# Patient Record
Sex: MALE | Race: WHITE | NOT HISPANIC OR LATINO
[De-identification: names, ages, dates, MRNs, and addresses within clinical notes are randomized per-mention and may not be internally consistent; named-entity substitution may affect disease eponyms.]

---

## 2017-01-16 ENCOUNTER — RX RENEWAL (OUTPATIENT)
Age: 52
End: 2017-01-16

## 2017-01-24 ENCOUNTER — APPOINTMENT (OUTPATIENT)
Dept: FAMILY MEDICINE | Facility: CLINIC | Age: 52
End: 2017-01-24

## 2017-01-24 VITALS
OXYGEN SATURATION: 98 % | WEIGHT: 283 LBS | SYSTOLIC BLOOD PRESSURE: 120 MMHG | DIASTOLIC BLOOD PRESSURE: 70 MMHG | HEART RATE: 89 BPM | BODY MASS INDEX: 40.52 KG/M2 | TEMPERATURE: 97.6 F | HEIGHT: 70 IN | RESPIRATION RATE: 13 BRPM

## 2017-01-24 DIAGNOSIS — E66.1 DRUG-INDUCED OBESITY: ICD-10-CM

## 2017-01-24 DIAGNOSIS — R25.1 TREMOR, UNSPECIFIED: ICD-10-CM

## 2017-01-24 LAB
BILIRUB UR QL STRIP: NORMAL
CLARITY UR: CLEAR
COLLECTION METHOD: NORMAL
GLUCOSE UR-MCNC: NORMAL
HCG UR QL: 0.2 EU/DL
HGB UR QL STRIP.AUTO: NORMAL
KETONES UR-MCNC: NORMAL
LEUKOCYTE ESTERASE UR QL STRIP: NORMAL
NITRITE UR QL STRIP: NORMAL
PH UR STRIP: 6.5
PROT UR STRIP-MCNC: NORMAL
SP GR UR STRIP: 1.02

## 2017-01-24 RX ORDER — SILDENAFIL CITRATE 100 MG/1
100 TABLET, FILM COATED ORAL
Qty: 6 | Refills: 0 | Status: ACTIVE | COMMUNITY
Start: 2016-11-04

## 2017-01-25 LAB
ALBUMIN SERPL ELPH-MCNC: 4.6 G/DL
ALP BLD-CCNC: 31 U/L
ALT SERPL-CCNC: 93 U/L
ANION GAP SERPL CALC-SCNC: 22 MMOL/L
AST SERPL-CCNC: 54 U/L
BASOPHILS # BLD AUTO: 0.01 K/UL
BASOPHILS NFR BLD AUTO: 0.1 %
BILIRUB SERPL-MCNC: 0.4 MG/DL
BUN SERPL-MCNC: 21 MG/DL
CALCIUM SERPL-MCNC: 10.9 MG/DL
CHLORIDE SERPL-SCNC: 99 MMOL/L
CO2 SERPL-SCNC: 22 MMOL/L
CREAT SERPL-MCNC: 1.12 MG/DL
EOSINOPHIL # BLD AUTO: 0 K/UL
EOSINOPHIL NFR BLD AUTO: 0 %
GLUCOSE SERPL-MCNC: 104 MG/DL
HCT VFR BLD CALC: 43.5 %
HGB BLD-MCNC: 14.2 G/DL
IMM GRANULOCYTES NFR BLD AUTO: 0.2 %
LYMPHOCYTES # BLD AUTO: 1.08 K/UL
LYMPHOCYTES NFR BLD AUTO: 13.5 %
MAN DIFF?: NORMAL
MCHC RBC-ENTMCNC: 27.2 PG
MCHC RBC-ENTMCNC: 32.6 GM/DL
MCV RBC AUTO: 83.3 FL
MONOCYTES # BLD AUTO: 1 K/UL
MONOCYTES NFR BLD AUTO: 12.5 %
NEUTROPHILS # BLD AUTO: 5.9 K/UL
NEUTROPHILS NFR BLD AUTO: 73.7 %
PLATELET # BLD AUTO: 191 K/UL
POTASSIUM SERPL-SCNC: 4.2 MMOL/L
PROT SERPL-MCNC: 7 G/DL
RBC # BLD: 5.22 M/UL
RBC # FLD: 15 %
SODIUM SERPL-SCNC: 143 MMOL/L
WBC # FLD AUTO: 8.01 K/UL

## 2017-01-31 ENCOUNTER — APPOINTMENT (OUTPATIENT)
Dept: FAMILY MEDICINE | Facility: CLINIC | Age: 52
End: 2017-01-31

## 2017-01-31 VITALS
OXYGEN SATURATION: 98 % | RESPIRATION RATE: 13 BRPM | HEART RATE: 83 BPM | HEIGHT: 70 IN | DIASTOLIC BLOOD PRESSURE: 74 MMHG | BODY MASS INDEX: 40.52 KG/M2 | WEIGHT: 283 LBS | SYSTOLIC BLOOD PRESSURE: 122 MMHG

## 2017-03-28 RX ORDER — CIPROFLOXACIN HYDROCHLORIDE 500 MG/1
500 TABLET, FILM COATED ORAL
Qty: 20 | Refills: 0 | Status: DISCONTINUED | COMMUNITY
Start: 2017-01-24 | End: 2017-03-28

## 2017-04-12 ENCOUNTER — APPOINTMENT (OUTPATIENT)
Dept: PULMONOLOGY | Facility: CLINIC | Age: 52
End: 2017-04-12

## 2017-04-12 VITALS
DIASTOLIC BLOOD PRESSURE: 68 MMHG | WEIGHT: 286 LBS | HEART RATE: 88 BPM | SYSTOLIC BLOOD PRESSURE: 132 MMHG | OXYGEN SATURATION: 96 % | BODY MASS INDEX: 41.04 KG/M2

## 2017-04-12 RX ORDER — METRONIDAZOLE 500 MG/1
500 TABLET ORAL
Qty: 15 | Refills: 0 | Status: DISCONTINUED | COMMUNITY
Start: 2017-01-24 | End: 2017-04-12

## 2017-04-12 RX ORDER — BACILLUS COAGULANS/INULIN 1B-250 MG
CAPSULE ORAL
Refills: 0 | Status: ACTIVE | COMMUNITY

## 2017-05-09 ENCOUNTER — APPOINTMENT (OUTPATIENT)
Dept: FAMILY MEDICINE | Facility: CLINIC | Age: 52
End: 2017-05-09

## 2017-05-09 VITALS
WEIGHT: 290 LBS | SYSTOLIC BLOOD PRESSURE: 124 MMHG | HEART RATE: 73 BPM | OXYGEN SATURATION: 98 % | RESPIRATION RATE: 12 BRPM | DIASTOLIC BLOOD PRESSURE: 76 MMHG | BODY MASS INDEX: 41.52 KG/M2 | HEIGHT: 70 IN

## 2017-05-09 RX ORDER — NYSTATIN 100000 [USP'U]/ML
100000 SUSPENSION ORAL
Refills: 0 | Status: DISCONTINUED | COMMUNITY
End: 2017-05-09

## 2017-07-19 ENCOUNTER — APPOINTMENT (OUTPATIENT)
Dept: PULMONOLOGY | Facility: CLINIC | Age: 52
End: 2017-07-19

## 2017-07-19 VITALS
OXYGEN SATURATION: 95 % | SYSTOLIC BLOOD PRESSURE: 120 MMHG | DIASTOLIC BLOOD PRESSURE: 70 MMHG | HEART RATE: 88 BPM | BODY MASS INDEX: 43.26 KG/M2 | HEIGHT: 70 IN

## 2017-07-19 VITALS — BODY MASS INDEX: 43.26 KG/M2 | WEIGHT: 301.5 LBS

## 2017-08-14 ENCOUNTER — RX RENEWAL (OUTPATIENT)
Age: 52
End: 2017-08-14

## 2017-08-28 ENCOUNTER — APPOINTMENT (OUTPATIENT)
Dept: FAMILY MEDICINE | Facility: CLINIC | Age: 52
End: 2017-08-28
Payer: MEDICARE

## 2017-08-28 VITALS
BODY MASS INDEX: 42.95 KG/M2 | HEIGHT: 70 IN | TEMPERATURE: 97.9 F | RESPIRATION RATE: 12 BRPM | DIASTOLIC BLOOD PRESSURE: 72 MMHG | WEIGHT: 300 LBS | SYSTOLIC BLOOD PRESSURE: 128 MMHG | OXYGEN SATURATION: 99 % | HEART RATE: 91 BPM

## 2017-08-28 PROCEDURE — 99214 OFFICE O/P EST MOD 30 MIN: CPT

## 2017-09-05 ENCOUNTER — APPOINTMENT (OUTPATIENT)
Dept: FAMILY MEDICINE | Facility: CLINIC | Age: 52
End: 2017-09-05
Payer: MEDICARE

## 2017-09-05 VITALS
TEMPERATURE: 98.7 F | HEART RATE: 92 BPM | DIASTOLIC BLOOD PRESSURE: 74 MMHG | BODY MASS INDEX: 43.05 KG/M2 | SYSTOLIC BLOOD PRESSURE: 126 MMHG | RESPIRATION RATE: 12 BRPM | OXYGEN SATURATION: 98 % | WEIGHT: 300 LBS

## 2017-09-05 PROCEDURE — 99214 OFFICE O/P EST MOD 30 MIN: CPT

## 2017-11-15 ENCOUNTER — APPOINTMENT (OUTPATIENT)
Dept: PULMONOLOGY | Facility: CLINIC | Age: 52
End: 2017-11-15

## 2017-11-21 ENCOUNTER — APPOINTMENT (OUTPATIENT)
Dept: PULMONOLOGY | Facility: CLINIC | Age: 52
End: 2017-11-21
Payer: MEDICARE

## 2017-11-21 VITALS — WEIGHT: 295 LBS | BODY MASS INDEX: 42.33 KG/M2

## 2017-11-21 VITALS — DIASTOLIC BLOOD PRESSURE: 74 MMHG | SYSTOLIC BLOOD PRESSURE: 120 MMHG

## 2017-11-21 PROCEDURE — 99214 OFFICE O/P EST MOD 30 MIN: CPT | Mod: 25

## 2017-11-21 PROCEDURE — 94010 BREATHING CAPACITY TEST: CPT

## 2017-12-06 ENCOUNTER — APPOINTMENT (OUTPATIENT)
Dept: FAMILY MEDICINE | Facility: CLINIC | Age: 52
End: 2017-12-06
Payer: MEDICARE

## 2017-12-06 VITALS
WEIGHT: 301 LBS | BODY MASS INDEX: 43.09 KG/M2 | HEIGHT: 70 IN | DIASTOLIC BLOOD PRESSURE: 72 MMHG | OXYGEN SATURATION: 95 % | SYSTOLIC BLOOD PRESSURE: 118 MMHG | RESPIRATION RATE: 12 BRPM | TEMPERATURE: 98.2 F | HEART RATE: 72 BPM

## 2017-12-06 DIAGNOSIS — M54.5 LOW BACK PAIN: ICD-10-CM

## 2017-12-06 DIAGNOSIS — Z87.19 PERSONAL HISTORY OF OTHER DISEASES OF THE DIGESTIVE SYSTEM: ICD-10-CM

## 2017-12-06 PROCEDURE — 99214 OFFICE O/P EST MOD 30 MIN: CPT | Mod: 25

## 2017-12-06 PROCEDURE — 36415 COLL VENOUS BLD VENIPUNCTURE: CPT

## 2017-12-10 LAB
ALBUMIN SERPL ELPH-MCNC: 4.2 G/DL
ALP BLD-CCNC: 37 U/L
ALT SERPL-CCNC: 59 U/L
ANION GAP SERPL CALC-SCNC: 14 MMOL/L
APPEARANCE: CLEAR
AST SERPL-CCNC: 32 U/L
BASOPHILS # BLD AUTO: 0 K/UL
BASOPHILS NFR BLD AUTO: 0 %
BILIRUB SERPL-MCNC: 0.3 MG/DL
BILIRUBIN URINE: NEGATIVE
BLOOD URINE: NEGATIVE
BUN SERPL-MCNC: 11 MG/DL
CALCIUM SERPL-MCNC: 10.4 MG/DL
CHLORIDE SERPL-SCNC: 103 MMOL/L
CHOLEST SERPL-MCNC: 180 MG/DL
CHOLEST/HDLC SERPL: 6 RATIO
CO2 SERPL-SCNC: 26 MMOL/L
COLOR: YELLOW
CREAT SERPL-MCNC: 0.9 MG/DL
EOSINOPHIL # BLD AUTO: 0 K/UL
EOSINOPHIL NFR BLD AUTO: 0 %
ERYTHROCYTE [SEDIMENTATION RATE] IN BLOOD BY WESTERGREN METHOD: 3 MM/HR
GLUCOSE QUALITATIVE U: NEGATIVE MG/DL
GLUCOSE SERPL-MCNC: 95 MG/DL
HCT VFR BLD CALC: 42.5 %
HDLC SERPL-MCNC: 30 MG/DL
HGB BLD-MCNC: 14.1 G/DL
IMM GRANULOCYTES NFR BLD AUTO: 0.7 %
KETONES URINE: NEGATIVE
LDLC SERPL CALC-MCNC: 107 MG/DL
LEUKOCYTE ESTERASE URINE: NEGATIVE
LYMPHOCYTES # BLD AUTO: 1.28 K/UL
LYMPHOCYTES NFR BLD AUTO: 28.8 %
MAN DIFF?: NORMAL
MCHC RBC-ENTMCNC: 27.1 PG
MCHC RBC-ENTMCNC: 33.2 GM/DL
MCV RBC AUTO: 81.7 FL
MONOCYTES # BLD AUTO: 0.47 K/UL
MONOCYTES NFR BLD AUTO: 10.6 %
NEUTROPHILS # BLD AUTO: 2.67 K/UL
NEUTROPHILS NFR BLD AUTO: 59.9 %
NITRITE URINE: NEGATIVE
PH URINE: 7.5
PLATELET # BLD AUTO: 174 K/UL
POTASSIUM SERPL-SCNC: 4.4 MMOL/L
PROT SERPL-MCNC: 6.8 G/DL
PROTEIN URINE: NEGATIVE MG/DL
RBC # BLD: 5.2 M/UL
RBC # FLD: 14.3 %
SODIUM SERPL-SCNC: 143 MMOL/L
SPECIFIC GRAVITY URINE: 1.01
TRIGL SERPL-MCNC: 216 MG/DL
TSH SERPL-ACNC: 1.85 UIU/ML
UROBILINOGEN URINE: NEGATIVE MG/DL
WBC # FLD AUTO: 4.45 K/UL

## 2018-01-27 ENCOUNTER — APPOINTMENT (OUTPATIENT)
Dept: FAMILY MEDICINE | Facility: CLINIC | Age: 53
End: 2018-01-27
Payer: MEDICARE

## 2018-01-27 VITALS
TEMPERATURE: 97.7 F | OXYGEN SATURATION: 95 % | SYSTOLIC BLOOD PRESSURE: 126 MMHG | WEIGHT: 297 LBS | HEIGHT: 70 IN | RESPIRATION RATE: 12 BRPM | BODY MASS INDEX: 42.52 KG/M2 | DIASTOLIC BLOOD PRESSURE: 74 MMHG | HEART RATE: 72 BPM

## 2018-01-27 PROCEDURE — 99213 OFFICE O/P EST LOW 20 MIN: CPT

## 2018-01-31 ENCOUNTER — TRANSCRIPTION ENCOUNTER (OUTPATIENT)
Age: 53
End: 2018-01-31

## 2018-02-20 ENCOUNTER — APPOINTMENT (OUTPATIENT)
Dept: FAMILY MEDICINE | Facility: CLINIC | Age: 53
End: 2018-02-20
Payer: MEDICARE

## 2018-02-20 VITALS
HEIGHT: 70 IN | RESPIRATION RATE: 13 BRPM | OXYGEN SATURATION: 97 % | HEART RATE: 98 BPM | SYSTOLIC BLOOD PRESSURE: 108 MMHG | BODY MASS INDEX: 41.52 KG/M2 | DIASTOLIC BLOOD PRESSURE: 70 MMHG | TEMPERATURE: 98.2 F | WEIGHT: 290 LBS

## 2018-02-20 DIAGNOSIS — M25.511 PAIN IN RIGHT SHOULDER: ICD-10-CM

## 2018-02-20 PROCEDURE — 99214 OFFICE O/P EST MOD 30 MIN: CPT

## 2018-04-23 ENCOUNTER — RX RENEWAL (OUTPATIENT)
Age: 53
End: 2018-04-23

## 2018-05-16 ENCOUNTER — APPOINTMENT (OUTPATIENT)
Dept: PULMONOLOGY | Facility: CLINIC | Age: 53
End: 2018-05-16
Payer: MEDICARE

## 2018-05-16 VITALS — BODY MASS INDEX: 42.9 KG/M2 | WEIGHT: 299 LBS

## 2018-05-16 VITALS
DIASTOLIC BLOOD PRESSURE: 68 MMHG | BODY MASS INDEX: 42.18 KG/M2 | OXYGEN SATURATION: 96 % | WEIGHT: 294 LBS | HEART RATE: 86 BPM | SYSTOLIC BLOOD PRESSURE: 136 MMHG

## 2018-05-16 PROCEDURE — 94060 EVALUATION OF WHEEZING: CPT

## 2018-05-16 PROCEDURE — 94664 DEMO&/EVAL PT USE INHALER: CPT | Mod: 59

## 2018-05-16 PROCEDURE — 99214 OFFICE O/P EST MOD 30 MIN: CPT | Mod: 25

## 2018-05-16 RX ORDER — PSYLLIUM HUSK 0.4 G
CAPSULE ORAL
Refills: 0 | Status: ACTIVE | COMMUNITY

## 2018-05-16 RX ORDER — TESTOSTERONE 16.2 MG/G
20.25 MG/ACT GEL TRANSDERMAL
Qty: 75 | Refills: 0 | Status: DISCONTINUED | COMMUNITY
Start: 2017-10-04 | End: 2018-05-16

## 2018-05-16 RX ORDER — TRIAMCINOLONE ACETONIDE 1 MG/G
0.1 CREAM TOPICAL
Qty: 30 | Refills: 0 | Status: DISCONTINUED | COMMUNITY
Start: 2016-08-08 | End: 2018-05-16

## 2018-05-16 RX ORDER — TAMSULOSIN HYDROCHLORIDE 0.4 MG/1
0.4 CAPSULE ORAL
Refills: 0 | Status: DISCONTINUED | COMMUNITY
End: 2018-05-16

## 2018-05-16 RX ORDER — MULTIVITAMIN
TABLET ORAL
Refills: 0 | Status: ACTIVE | COMMUNITY

## 2018-05-23 ENCOUNTER — APPOINTMENT (OUTPATIENT)
Dept: FAMILY MEDICINE | Facility: CLINIC | Age: 53
End: 2018-05-23
Payer: MEDICARE

## 2018-05-23 VITALS
WEIGHT: 298 LBS | OXYGEN SATURATION: 98 % | SYSTOLIC BLOOD PRESSURE: 124 MMHG | HEART RATE: 92 BPM | TEMPERATURE: 97.7 F | HEIGHT: 70 IN | RESPIRATION RATE: 13 BRPM | BODY MASS INDEX: 42.66 KG/M2 | DIASTOLIC BLOOD PRESSURE: 70 MMHG

## 2018-05-23 PROCEDURE — 99214 OFFICE O/P EST MOD 30 MIN: CPT

## 2018-06-26 ENCOUNTER — APPOINTMENT (OUTPATIENT)
Dept: FAMILY MEDICINE | Facility: CLINIC | Age: 53
End: 2018-06-26
Payer: MEDICARE

## 2018-06-26 VITALS
OXYGEN SATURATION: 98 % | WEIGHT: 300 LBS | HEIGHT: 70 IN | RESPIRATION RATE: 13 BRPM | SYSTOLIC BLOOD PRESSURE: 124 MMHG | HEART RATE: 90 BPM | DIASTOLIC BLOOD PRESSURE: 76 MMHG | BODY MASS INDEX: 42.95 KG/M2

## 2018-06-26 DIAGNOSIS — N20.0 CALCULUS OF KIDNEY: ICD-10-CM

## 2018-06-26 DIAGNOSIS — G47.00 INSOMNIA, UNSPECIFIED: ICD-10-CM

## 2018-06-26 DIAGNOSIS — Z87.19 PERSONAL HISTORY OF OTHER DISEASES OF THE DIGESTIVE SYSTEM: ICD-10-CM

## 2018-06-26 PROCEDURE — 99214 OFFICE O/P EST MOD 30 MIN: CPT | Mod: 25

## 2018-06-26 PROCEDURE — 83036 HEMOGLOBIN GLYCOSYLATED A1C: CPT | Mod: QW

## 2018-06-26 PROCEDURE — 36415 COLL VENOUS BLD VENIPUNCTURE: CPT

## 2018-06-26 NOTE — HISTORY OF PRESENT ILLNESS
[FreeTextEntry1] : Patient in for follow up of LFT and DM HTN and BMI and HLD  Has BIB on CPAP  compliant\par In for labs wants lab slip, wants to consider weight loss program\par Has follow up with Psychiatry

## 2018-06-26 NOTE — HEALTH RISK ASSESSMENT
[No falls in past year] : Patient reported no falls in the past year [1] : 1) Little interest or pleasure doing things for several days (1) [0] : 2) Feeling down, depressed, or hopeless: Not at all (0) [] : No [[id number]] : 1

## 2018-06-26 NOTE — COUNSELING
[Healthy eating counseling provided] : healthy eating [Activity counseling provided] : activity [Keep Food Diary] : Keep food diary [Low Salt Diet] : Low salt diet [Walking] : Walking [Plan in advance] : Plan in advance [Good understanding] : Patient has a good understanding of lifestyle changes and the steps needed to achieve self management goals

## 2018-06-26 NOTE — PLAN
[FreeTextEntry1] : Patient in for follow up of LFT and DM HTN and BMI and HLD  Has BIB on CPAP  compliant\par In for labs wants lab slip, wants to consider weight loss program\par Has follow up with Psychiatry\par Poct A1c reviewed patient DM good  RTC 3 month advised weight loss and weight watchers

## 2018-06-26 NOTE — PHYSICAL EXAM

## 2018-07-27 ENCOUNTER — RX RENEWAL (OUTPATIENT)
Age: 53
End: 2018-07-27

## 2018-08-07 LAB — HBA1C MFR BLD HPLC: 5.4

## 2018-08-22 ENCOUNTER — APPOINTMENT (OUTPATIENT)
Dept: PULMONOLOGY | Facility: CLINIC | Age: 53
End: 2018-08-22
Payer: MEDICARE

## 2018-08-22 VITALS
HEART RATE: 87 BPM | HEIGHT: 70 IN | OXYGEN SATURATION: 96 % | BODY MASS INDEX: 42.33 KG/M2 | SYSTOLIC BLOOD PRESSURE: 130 MMHG | DIASTOLIC BLOOD PRESSURE: 78 MMHG

## 2018-08-22 VITALS — WEIGHT: 295 LBS | BODY MASS INDEX: 42.33 KG/M2

## 2018-08-22 PROCEDURE — 94060 EVALUATION OF WHEEZING: CPT

## 2018-08-22 PROCEDURE — 99214 OFFICE O/P EST MOD 30 MIN: CPT | Mod: 25

## 2018-08-22 PROCEDURE — 94664 DEMO&/EVAL PT USE INHALER: CPT | Mod: 59

## 2018-09-06 LAB — HBA1C MFR BLD: 5.3

## 2018-09-14 ENCOUNTER — RX RENEWAL (OUTPATIENT)
Age: 53
End: 2018-09-14

## 2018-09-18 ENCOUNTER — RX RENEWAL (OUTPATIENT)
Age: 53
End: 2018-09-18

## 2018-10-16 ENCOUNTER — APPOINTMENT (OUTPATIENT)
Dept: FAMILY MEDICINE | Facility: CLINIC | Age: 53
End: 2018-10-16
Payer: MEDICARE

## 2018-10-16 VITALS
OXYGEN SATURATION: 98 % | HEIGHT: 70 IN | RESPIRATION RATE: 13 BRPM | WEIGHT: 290 LBS | BODY MASS INDEX: 41.52 KG/M2 | HEART RATE: 76 BPM | SYSTOLIC BLOOD PRESSURE: 128 MMHG | DIASTOLIC BLOOD PRESSURE: 74 MMHG

## 2018-10-16 PROCEDURE — G0439: CPT

## 2018-10-16 PROCEDURE — G0402 INITIAL PREVENTIVE EXAM: CPT

## 2018-10-18 NOTE — REVIEW OF SYSTEMS
[Redness] : redness [Dryness] : dryness [Vision Problems] : vision problems [Constipation] : constipation [Diarrhea] : diarrhea [Negative] : Heme/Lymph [Fever] : no fever [Chills] : no chills [Fatigue] : no fatigue [Night Sweats] : no night sweats [Recent Change In Weight] : ~T no recent weight change [Discharge] : no discharge [Pain] : no pain [Itching] : no itching [Earache] : no earache [Hearing Loss] : no hearing loss [Nosebleeds] : no nosebleeds [Postnasal Drip] : no postnasal drip [Nasal Discharge] : no nasal discharge [Sore Throat] : no sore throat [Hoarseness] : no hoarseness [Chest Pain] : no chest pain [Palpitations] : no palpitations [Claudication] : no  leg claudication [Lower Ext Edema] : no lower extremity edema [Orthopena] : no orthopnea [Paroysmal Nocturnal Dyspnea] : no paroysmal nocturnal dyspnea [Shortness Of Breath] : no shortness of breath [Wheezing] : no wheezing [Cough] : no cough [Dyspnea on Exertion] : not dyspnea on exertion [Abdominal Pain] : no abdominal pain [Nausea] : no nausea [Vomiting] : no vomiting [Heartburn] : no heartburn [Melena] : no melena [Dysuria] : no dysuria [Incontinence] : no incontinence [Hesitancy] : no hesitancy [Nocturia] : no nocturia [Hematuria] : no hematuria [Frequency] : no frequency [Joint Pain] : no joint pain [Joint Stiffness] : no joint stiffness [Muscle Pain] : no muscle pain [Muscle Weakness] : no muscle weakness [Back Pain] : no back pain [Joint Swelling] : no joint swelling [Itching] : no itching [Hair Changes] : no hair changes [Skin Rash] : no skin rash [Headache] : no headache [Dizziness] : no dizziness [Fainting] : no fainting [Confusion] : no confusion [Unsteady Walk] : no ataxia [Memory Loss] : no memory loss [Suicidal] : not suicidal [Insomnia] : no insomnia [Anxiety] : no anxiety [Depression] : no depression [Easy Bleeding] : no easy bleeding [Easy Bruising] : no easy bruising [Swollen Glands] : no swollen glands [FreeTextEntry7] : second to hemorroids

## 2018-10-18 NOTE — HEALTH RISK ASSESSMENT
[Good] : ~his/her~  mood as  good [Patient reported colonoscopy was normal] : Patient reported colonoscopy was normal [None] : None [Alone] : lives alone [On disability] : on disability [Retired] : retired [College] : College [Single] : single [Feels Safe at Home] : Feels safe at home [Fully functional (bathing, dressing, toileting, transferring, walking, feeding)] : Fully functional (bathing, dressing, toileting, transferring, walking, feeding) [Fully functional (using the telephone, shopping, preparing meals, housekeeping, doing laundry, using] : Fully functional and needs no help or supervision to perform IADLs (using the telephone, shopping, preparing meals, housekeeping, doing laundry, using transportation, managing medications and managing finances) [Reports changes in vision] : Reports changes in vision [Reports changes in dental health] : Reports changes in dental health [Smoke Detector] : smoke detector [Carbon Monoxide Detector] : carbon monoxide detector [Safety elements used in home] : safety elements used in home [Seat Belt] :  uses seat belt [Sunscreen] : uses sunscreen [Patient declined discussion] : Patient declined discussion [FreeTextEntry1] : Eyes [] : No [de-identified] : New Psychiatrist Dr. Aubrey Santos [Change in mental status noted] : No change in mental status noted [Language] : denies difficulty with language [Behavior] : denies difficulty with behavior [Learning/Retaining New Information] : denies difficulty learning/retaining new information [Handling Complex Tasks] : denies difficulty handling complex tasks [Reasoning] : denies difficulty with reasoning [Spatial Ability and Orientation] : denies difficulty with spatial ability and orientation [Sexually Active] : not sexually active [High Risk Behavior] : no high risk behavior [Reports changes in hearing] : Reports no changes in hearing [Travel to Developing Areas] : does not  travel to developing areas [TB Exposure] : is not being exposed to tuberculosis [Caregiver Concerns] : does not have caregiver concerns [ColonoscopyDate] : 03/09 [ColonoscopyComments] : Had polyps removed [HIVDate] : 01/85 [HepatitisCComments] : unsure [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

## 2018-10-18 NOTE — HISTORY OF PRESENT ILLNESS
[FreeTextEntry1] : Patient here for Medicare Wellness exam Patient with long standing mental illness and is stable [de-identified] : He states his only complaint at this time is the retinal tears in both eyes.  Sees Dr. Cheney next week (10/24/18).

## 2018-11-08 LAB
MICROALBUMIN 24H UR DL<=1MG/L-MCNC: <3
MICROALBUMIN/CREAT 24H UR-RTO: <4.4

## 2018-12-12 ENCOUNTER — APPOINTMENT (OUTPATIENT)
Dept: PULMONOLOGY | Facility: CLINIC | Age: 53
End: 2018-12-12
Payer: MEDICARE

## 2018-12-12 VITALS — OXYGEN SATURATION: 97 % | SYSTOLIC BLOOD PRESSURE: 112 MMHG | HEART RATE: 69 BPM | DIASTOLIC BLOOD PRESSURE: 60 MMHG

## 2018-12-12 VITALS — WEIGHT: 278 LBS | BODY MASS INDEX: 39.89 KG/M2

## 2018-12-12 PROCEDURE — 99214 OFFICE O/P EST MOD 30 MIN: CPT | Mod: 25

## 2018-12-12 PROCEDURE — 94010 BREATHING CAPACITY TEST: CPT

## 2019-01-08 ENCOUNTER — APPOINTMENT (OUTPATIENT)
Dept: FAMILY MEDICINE | Facility: CLINIC | Age: 54
End: 2019-01-08

## 2019-01-08 VITALS
SYSTOLIC BLOOD PRESSURE: 120 MMHG | RESPIRATION RATE: 13 BRPM | HEART RATE: 72 BPM | WEIGHT: 278 LBS | BODY MASS INDEX: 39.8 KG/M2 | DIASTOLIC BLOOD PRESSURE: 68 MMHG | HEIGHT: 70 IN | OXYGEN SATURATION: 98 %

## 2019-01-08 NOTE — HISTORY OF PRESENT ILLNESS
[FreeTextEntry8] : 53 yr M here to  script for Immunity testing to Hep B as he only had 2 shots of Hep B vaccination.Pt is going to outside lab with more bloodwork for his pschiatrist.

## 2019-01-08 NOTE — REVIEW OF SYSTEMS
[Fever] : no fever [Chills] : no chills [Fatigue] : no fatigue [Night Sweats] : no night sweats [Recent Change In Weight] : ~T no recent weight change [Discharge] : no discharge [Pain] : no pain [Redness] : no redness [Dryness] : dryness [Vision Problems] : vision problems [Itching] : no itching [Earache] : no earache [Hearing Loss] : no hearing loss [Nosebleeds] : no nosebleeds [Postnasal Drip] : no postnasal drip [Nasal Discharge] : no nasal discharge [Sore Throat] : no sore throat [Hoarseness] : no hoarseness [Chest Pain] : no chest pain [Palpitations] : no palpitations [Claudication] : no  leg claudication [Lower Ext Edema] : no lower extremity edema [Orthopena] : no orthopnea [Paroysmal Nocturnal Dyspnea] : no paroysmal nocturnal dyspnea [Shortness Of Breath] : no shortness of breath [Wheezing] : no wheezing [Cough] : no cough [Dyspnea on Exertion] : not dyspnea on exertion [Abdominal Pain] : no abdominal pain [Nausea] : no nausea [Constipation] : constipation [Diarrhea] : diarrhea [Vomiting] : no vomiting [Heartburn] : no heartburn [Melena] : no melena [Dysuria] : no dysuria [Incontinence] : no incontinence [Hesitancy] : no hesitancy [Nocturia] : no nocturia [Hematuria] : no hematuria [Frequency] : no frequency [Joint Pain] : no joint pain [Joint Stiffness] : no joint stiffness [Muscle Pain] : no muscle pain [Muscle Weakness] : no muscle weakness [Back Pain] : no back pain [Joint Swelling] : no joint swelling [Hair Changes] : no hair changes [Skin Rash] : no skin rash [Headache] : no headache [Dizziness] : no dizziness [Fainting] : no fainting [Confusion] : no confusion [Unsteady Walk] : no ataxia [Memory Loss] : no memory loss [Suicidal] : not suicidal [Insomnia] : no insomnia [Anxiety] : no anxiety [Depression] : no depression [Easy Bleeding] : no easy bleeding [Easy Bruising] : no easy bruising [Swollen Glands] : no swollen glands [Negative] : Heme/Lymph [FreeTextEntry7] : second to hemorroids

## 2019-01-08 NOTE — PHYSICAL EXAM
[No Acute Distress] : no acute distress [Well Nourished] : well nourished [Well Developed] : well developed [Well-Appearing] : well-appearing [No JVD] : no jugular venous distention [Supple] : supple [No Respiratory Distress] : no respiratory distress  [Clear to Auscultation] : lungs were clear to auscultation bilaterally [No Accessory Muscle Use] : no accessory muscle use [Normal Rate] : normal rate  [Regular Rhythm] : with a regular rhythm [Normal S1, S2] : normal S1 and S2 [No Murmur] : no murmur heard [No Edema] : there was no peripheral edema [Soft] : abdomen soft [Non Tender] : non-tender [Non-distended] : non-distended [No Masses] : no abdominal mass palpated [No HSM] : no HSM [Normal Bowel Sounds] : normal bowel sounds [No CVA Tenderness] : no CVA  tenderness [No Spinal Tenderness] : no spinal tenderness [No Joint Swelling] : no joint swelling [Grossly Normal Strength/Tone] : grossly normal strength/tone [No Rash] : no rash [Normal Gait] : normal gait [Coordination Grossly Intact] : coordination grossly intact [No Focal Deficits] : no focal deficits [Deep Tendon Reflexes (DTR)] : deep tendon reflexes were 2+ and symmetric [Normal Affect] : the affect was normal [Normal Insight/Judgement] : insight and judgment were intact

## 2019-01-08 NOTE — HEALTH RISK ASSESSMENT
[] : No [No falls in past year] : Patient reported no falls in the past year [FreeTextEntry1] : On Tx

## 2019-01-31 ENCOUNTER — RX RENEWAL (OUTPATIENT)
Age: 54
End: 2019-01-31

## 2019-02-05 ENCOUNTER — APPOINTMENT (OUTPATIENT)
Dept: FAMILY MEDICINE | Facility: CLINIC | Age: 54
End: 2019-02-05
Payer: MEDICARE

## 2019-02-05 VITALS
WEIGHT: 274 LBS | HEIGHT: 70 IN | DIASTOLIC BLOOD PRESSURE: 70 MMHG | SYSTOLIC BLOOD PRESSURE: 110 MMHG | TEMPERATURE: 97.7 F | BODY MASS INDEX: 39.22 KG/M2 | HEART RATE: 66 BPM | RESPIRATION RATE: 13 BRPM | OXYGEN SATURATION: 97 %

## 2019-02-05 PROCEDURE — 99214 OFFICE O/P EST MOD 30 MIN: CPT | Mod: 25

## 2019-02-05 PROCEDURE — 36415 COLL VENOUS BLD VENIPUNCTURE: CPT

## 2019-02-05 NOTE — HISTORY OF PRESENT ILLNESS
[FreeTextEntry1] : Needs labs and forms completed for medical transport from colonoscopy  Hx of Multi  Psychiatric Dx and post anesthesia  had labs prior with endocrine Needs Hep B titer volunteers at hospital also wants CBC done and sent to [facility] as per patient

## 2019-02-05 NOTE — COUNSELING
[Weight management counseling provided] : Weight management [Healthy eating counseling provided] : healthy eating [Activity counseling provided] : activity [Behavioral health counseling provided] : behavioral health  [Limited decision making ability] : Limited decision making ability [Good understanding] : Patient has a good understanding of lifestyle changes and the steps needed to achieve self management goals

## 2019-02-05 NOTE — HEALTH RISK ASSESSMENT
[No falls in past year] : Patient reported no falls in the past year [0] : 2) Feeling down, depressed, or hopeless: Not at all (0) [] : No [[id number]] : 0

## 2019-02-05 NOTE — PHYSICAL EXAM

## 2019-02-07 LAB
BASOPHILS # BLD AUTO: 0.01 K/UL
BASOPHILS NFR BLD AUTO: 0.2 %
EOSINOPHIL # BLD AUTO: 0 K/UL
EOSINOPHIL NFR BLD AUTO: 0 %
HBV SURFACE AB SER QL: REACTIVE
HCT VFR BLD CALC: 42.6 %
HGB BLD-MCNC: 13.5 G/DL
IMM GRANULOCYTES NFR BLD AUTO: 0.2 %
LYMPHOCYTES # BLD AUTO: 1.01 K/UL
LYMPHOCYTES NFR BLD AUTO: 23.3 %
MAN DIFF?: NORMAL
MCHC RBC-ENTMCNC: 25.8 PG
MCHC RBC-ENTMCNC: 31.7 GM/DL
MCV RBC AUTO: 81.5 FL
MONOCYTES # BLD AUTO: 0.42 K/UL
MONOCYTES NFR BLD AUTO: 9.7 %
NEUTROPHILS # BLD AUTO: 2.89 K/UL
NEUTROPHILS NFR BLD AUTO: 66.6 %
PLATELET # BLD AUTO: 191 K/UL
RBC # BLD: 5.23 M/UL
RBC # FLD: 14.6 %
WBC # FLD AUTO: 4.34 K/UL

## 2019-02-14 ENCOUNTER — RX RENEWAL (OUTPATIENT)
Age: 54
End: 2019-02-14

## 2019-02-19 ENCOUNTER — APPOINTMENT (OUTPATIENT)
Dept: FAMILY MEDICINE | Facility: CLINIC | Age: 54
End: 2019-02-19
Payer: MEDICARE

## 2019-02-19 VITALS
SYSTOLIC BLOOD PRESSURE: 116 MMHG | DIASTOLIC BLOOD PRESSURE: 62 MMHG | BODY MASS INDEX: 36.22 KG/M2 | TEMPERATURE: 98 F | HEIGHT: 70 IN | HEART RATE: 72 BPM | WEIGHT: 253 LBS | OXYGEN SATURATION: 98 %

## 2019-02-19 DIAGNOSIS — Z01.84 ENCOUNTER FOR ANTIBODY RESPONSE EXAMINATION: ICD-10-CM

## 2019-02-19 DIAGNOSIS — R79.9 ABNORMAL FINDING OF BLOOD CHEMISTRY, UNSPECIFIED: ICD-10-CM

## 2019-02-19 DIAGNOSIS — R79.89 OTHER SPECIFIED ABNORMAL FINDINGS OF BLOOD CHEMISTRY: ICD-10-CM

## 2019-02-19 PROCEDURE — 99215 OFFICE O/P EST HI 40 MIN: CPT

## 2019-02-19 NOTE — COUNSELING
[Weight management counseling provided] : Weight management [Healthy eating counseling provided] : healthy eating [Activity counseling provided] : activity [Behavioral health counseling provided] : behavioral health  [Fall prevention counseling provided] : fall prevention  [None] : None [Good understanding] : Patient has a good understanding of lifestyle changes and the steps needed to achieve self management goals

## 2019-02-19 NOTE — HEALTH RISK ASSESSMENT
[No falls in past year] : Patient reported no falls in the past year [0] : 2) Feeling down, depressed, or hopeless: Not at all (0) [] : No [[id number]] : 0

## 2019-02-19 NOTE — PHYSICAL EXAM

## 2019-02-19 NOTE — HISTORY OF PRESENT ILLNESS
[FreeTextEntry1] : Patient here for follow up on needing a psychiatrist.  States he needs clearance for a Colonoscopy scheduled for 2/26/19 at [facility], with Dr. Tom Teran, and psych is Dr. Aubrey Santos. [de-identified] : Says he needs a form filled out and fax back, we filled it out wrong.  Patient c/o neck discomfort.

## 2019-02-21 ENCOUNTER — RX RENEWAL (OUTPATIENT)
Age: 54
End: 2019-02-21

## 2019-03-09 ENCOUNTER — EMERGENCY (EMERGENCY)
Facility: HOSPITAL | Age: 54
LOS: 1 days | End: 2019-03-09
Admitting: EMERGENCY MEDICINE
Payer: MEDICARE

## 2019-03-09 PROCEDURE — 99282 EMERGENCY DEPT VISIT SF MDM: CPT

## 2019-03-20 ENCOUNTER — APPOINTMENT (OUTPATIENT)
Dept: PSYCHIATRY | Facility: CLINIC | Age: 54
End: 2019-03-20
Payer: MEDICARE

## 2019-03-20 PROCEDURE — 99205 OFFICE O/P NEW HI 60 MIN: CPT

## 2019-04-02 ENCOUNTER — RX RENEWAL (OUTPATIENT)
Age: 54
End: 2019-04-02

## 2019-04-22 ENCOUNTER — APPOINTMENT (OUTPATIENT)
Dept: FAMILY MEDICINE | Facility: CLINIC | Age: 54
End: 2019-04-22

## 2019-04-25 ENCOUNTER — RX RENEWAL (OUTPATIENT)
Age: 54
End: 2019-04-25

## 2019-04-25 RX ORDER — SALMETEROL XINAFOATE 50 UG/1
50 POWDER, METERED ORAL; RESPIRATORY (INHALATION)
Qty: 3 | Refills: 1 | Status: DISCONTINUED | COMMUNITY
Start: 2018-07-27 | End: 2019-04-25

## 2019-05-01 ENCOUNTER — EMERGENCY (EMERGENCY)
Facility: HOSPITAL | Age: 54
LOS: 1 days | End: 2019-05-01
Admitting: EMERGENCY MEDICINE
Payer: MEDICARE

## 2019-05-01 PROCEDURE — 71045 X-RAY EXAM CHEST 1 VIEW: CPT | Mod: 26

## 2019-05-01 PROCEDURE — 99284 EMERGENCY DEPT VISIT MOD MDM: CPT

## 2019-05-02 PROCEDURE — 93010 ELECTROCARDIOGRAM REPORT: CPT

## 2019-05-03 ENCOUNTER — APPOINTMENT (OUTPATIENT)
Dept: FAMILY MEDICINE | Facility: CLINIC | Age: 54
End: 2019-05-03
Payer: MEDICARE

## 2019-05-03 VITALS
WEIGHT: 238 LBS | DIASTOLIC BLOOD PRESSURE: 70 MMHG | OXYGEN SATURATION: 98 % | HEART RATE: 80 BPM | RESPIRATION RATE: 13 BRPM | SYSTOLIC BLOOD PRESSURE: 118 MMHG | BODY MASS INDEX: 34.07 KG/M2 | HEIGHT: 70 IN

## 2019-05-03 PROCEDURE — 99214 OFFICE O/P EST MOD 30 MIN: CPT

## 2019-05-03 NOTE — PHYSICAL EXAM
[Well Nourished] : well nourished [No Acute Distress] : no acute distress [Well Developed] : well developed [Well-Appearing] : well-appearing [No JVD] : no jugular venous distention [Supple] : supple [Clear to Auscultation] : lungs were clear to auscultation bilaterally [No Respiratory Distress] : no respiratory distress  [No Accessory Muscle Use] : no accessory muscle use [Normal Rate] : normal rate  [Regular Rhythm] : with a regular rhythm [Normal S1, S2] : normal S1 and S2 [No Murmur] : no murmur heard [No Edema] : there was no peripheral edema [Non-distended] : non-distended [Non Tender] : non-tender [Soft] : abdomen soft [No Masses] : no abdominal mass palpated [No HSM] : no HSM [Normal Bowel Sounds] : normal bowel sounds [No Joint Swelling] : no joint swelling [No Spinal Tenderness] : no spinal tenderness [No CVA Tenderness] : no CVA  tenderness [No Rash] : no rash [Grossly Normal Strength/Tone] : grossly normal strength/tone [Normal Gait] : normal gait [Coordination Grossly Intact] : coordination grossly intact [Deep Tendon Reflexes (DTR)] : deep tendon reflexes were 2+ and symmetric [No Focal Deficits] : no focal deficits [Normal Insight/Judgement] : insight and judgment were intact [Normal Affect] : the affect was normal

## 2019-05-03 NOTE — ASSESSMENT
[FreeTextEntry1] : 54-year-old male with bipolar disorder and chronic schizophrenia now states he is hallucinating and would like to get inpatient treatment. He recently saw a psych provider but was told not to return to that practice. He has no other psychiatrists and states his symptoms are getting worse. He denies any suicide ideation with no intention to harm himself or others. He is declining to go to [employer] as he used to work there, he will drive himself to the ER or go to [facility].

## 2019-05-03 NOTE — HISTORY OF PRESENT ILLNESS
[FreeTextEntry8] : 54-year-old male here for an acute visit. Patient states he saw a psychiatrist nurse practitioner for schizophrenia, bipolar disorder. Patient states he was asked to not return to that practice. He has no other psychiatrist. Patient states he is hallucinating. He would like to go to the ER. He denies any suicide ideation but would like to get admitted and get treated. He is on psych meds as listed.

## 2019-05-30 ENCOUNTER — OTHER (OUTPATIENT)
Age: 54
End: 2019-05-30

## 2019-05-30 ENCOUNTER — APPOINTMENT (OUTPATIENT)
Dept: FAMILY MEDICINE | Facility: CLINIC | Age: 54
End: 2019-05-30
Payer: MEDICARE

## 2019-05-30 VITALS
HEIGHT: 70 IN | RESPIRATION RATE: 12 BRPM | TEMPERATURE: 97.6 F | WEIGHT: 236 LBS | HEART RATE: 68 BPM | DIASTOLIC BLOOD PRESSURE: 70 MMHG | BODY MASS INDEX: 33.79 KG/M2 | OXYGEN SATURATION: 98 % | SYSTOLIC BLOOD PRESSURE: 124 MMHG

## 2019-05-30 DIAGNOSIS — Z09 ENCOUNTER FOR FOLLOW-UP EXAMINATION AFTER COMPLETED TREATMENT FOR CONDITIONS OTHER THAN MALIGNANT NEOPLASM: ICD-10-CM

## 2019-05-30 DIAGNOSIS — N52.9 MALE ERECTILE DYSFUNCTION, UNSPECIFIED: ICD-10-CM

## 2019-05-30 PROCEDURE — 99496 TRANSJ CARE MGMT HIGH F2F 7D: CPT

## 2019-05-30 NOTE — HEALTH RISK ASSESSMENT
[Intercurrent hospitalizations] : was admitted to the hospital  [No falls in past year] : Patient reported no falls in the past year [] : No [FreeTextEntry1] : on tx

## 2019-05-30 NOTE — ASSESSMENT
[FreeTextEntry1] : Admitted [facility] for hallucinations. Was admitted for 20 days.Pt feels better now since D/C.\par Pt has appt with Endo 6/26/19 for impotence.Pulm 06/05/19 for Asthma.cardio today for low BP.Sleep MD on 6/17/19.\par Pt is going for Blood work tommorow.\par Pschy--Jayashree Mckeon [phone].Therapist--Kwesi Oden.\par Medications are as listed.\par Patient to get blood work faxed to our office.\par Psychiatric appointment monthly, patient sees therapist weekly.\par Care management referral generated.\par \par \par Followup one month.

## 2019-05-30 NOTE — PHYSICAL EXAM

## 2019-05-30 NOTE — HISTORY OF PRESENT ILLNESS
[Post-hospitalization from ___ Hospital] : Post-hospitalization from [unfilled] Hospital [Admitted on: ___] : The patient was admitted on [unfilled] [Discharged on ___] : discharged on [unfilled] [Discharge Summary] : discharge summary [Discharge Med List] : discharge medication list [FreeTextEntry2] : Admitted [facility] for hallucinations. Was admitted for 20 days.Pt feels better now since D/C.\par Pt has appt with Endo 6/26/19 for impotence.Pulm 06/05/19 for Asthma.cardio today for low BP.Sleep MD on 6/17/19.\par Pt is going for Blood work tommorow.\par Pschy--Jayashree Mckeon [phone].Therapist--Kwesi Oden.\par

## 2019-05-30 NOTE — REVIEW OF SYSTEMS
[Depression] : depression [Negative] : Heme/Lymph [Suicidal] : not suicidal [Insomnia] : no insomnia [FreeTextEntry7] : Has Hernia abd.

## 2019-05-30 NOTE — COUNSELING
[Weight management counseling provided] : Weight management [Healthy eating counseling provided] : healthy eating [Activity counseling provided] : activity [Behavioral health counseling provided] : behavioral health  [Engage in a relaxing activity] : Engage in a relaxing activity [Plan in advance] : Plan in advance [None] : None [Good understanding] : Patient has a good understanding of lifestyle changes and the steps needed to achieve self management goals

## 2019-06-17 ENCOUNTER — RX RENEWAL (OUTPATIENT)
Age: 54
End: 2019-06-17

## 2019-06-18 ENCOUNTER — APPOINTMENT (OUTPATIENT)
Dept: PULMONOLOGY | Facility: CLINIC | Age: 54
End: 2019-06-18
Payer: MEDICARE

## 2019-06-18 VITALS — WEIGHT: 242 LBS | BODY MASS INDEX: 34.72 KG/M2

## 2019-06-18 VITALS — DIASTOLIC BLOOD PRESSURE: 62 MMHG | HEART RATE: 60 BPM | OXYGEN SATURATION: 97 % | SYSTOLIC BLOOD PRESSURE: 112 MMHG

## 2019-06-18 PROCEDURE — 94010 BREATHING CAPACITY TEST: CPT

## 2019-06-18 PROCEDURE — 99214 OFFICE O/P EST MOD 30 MIN: CPT | Mod: 25

## 2019-06-18 RX ORDER — ELECTROLYTES/DEXTROSE
SOLUTION, ORAL ORAL
Refills: 0 | Status: DISCONTINUED | COMMUNITY

## 2019-06-18 RX ORDER — PANAX GINSENG ROOT 518 MG
CAPSULE ORAL
Refills: 0 | Status: DISCONTINUED | COMMUNITY
End: 2019-06-18

## 2019-06-18 RX ORDER — DOCUSATE SODIUM 100 MG/1
100 CAPSULE ORAL 3 TIMES DAILY
Refills: 0 | Status: DISCONTINUED | COMMUNITY

## 2019-06-18 RX ORDER — PAROXETINE HYDROCHLORIDE 40 MG/1
40 TABLET, FILM COATED ORAL
Refills: 0 | Status: DISCONTINUED | COMMUNITY

## 2019-06-18 RX ORDER — PALIPERIDONE PALMITATE 156 MG/ML
156 INJECTION INTRAMUSCULAR
Refills: 0 | Status: DISCONTINUED | COMMUNITY

## 2019-06-18 RX ORDER — RAMELTEON 8 MG/1
8 TABLET, FILM COATED ORAL
Qty: 30 | Refills: 0 | Status: DISCONTINUED | COMMUNITY
End: 2019-06-18

## 2019-06-18 RX ORDER — CHOLECALCIFEROL (VITAMIN D3) 125 MCG
50 MCG TABLET ORAL
Refills: 0 | Status: DISCONTINUED | COMMUNITY
End: 2019-06-18

## 2019-06-18 RX ORDER — TRAZODONE HYDROCHLORIDE 100 MG/1
100 TABLET ORAL
Refills: 0 | Status: DISCONTINUED | COMMUNITY

## 2019-06-18 NOTE — REVIEW OF SYSTEMS
[As Noted in HPI] : as noted in HPI [Negative] : Endocrine [de-identified] : BIB on CPAP [de-identified] : Bipolar

## 2019-06-18 NOTE — PHYSICAL EXAM
[General Appearance - Well Nourished] : well nourished [General Appearance - Well Developed] : well developed [Normal Conjunctiva] : the conjunctiva exhibited no abnormalities [Normal Oropharynx] : normal oropharynx [Neck Cervical Mass (___cm)] : no neck mass was observed [Neck Appearance] : the appearance of the neck was normal [Jugular Venous Distention Increased] : there was no jugular-venous distention [Thyroid Diffuse Enlargement] : the thyroid was not enlarged [Heart Sounds] : normal S1 and S2 [Murmurs] : no murmurs present [Heart Rate And Rhythm] : heart rate and rhythm were normal [Arterial Pulses Normal] : the arterial pulses were normal [Edema] : no peripheral edema present [] : no respiratory distress [Respiration, Rhythm And Depth] : normal respiratory rhythm and effort [Exaggerated Use Of Accessory Muscles For Inspiration] : no accessory muscle use [Auscultation Breath Sounds / Voice Sounds] : lungs were clear to auscultation bilaterally [Abnormal Walk] : normal gait [Abdomen Soft] : soft [Nail Clubbing] : no clubbing of the fingernails [Cyanosis, Localized] : no localized cyanosis [Skin Turgor] : normal skin turgor [Oriented To Time, Place, And Person] : oriented to person, place, and time

## 2019-08-13 ENCOUNTER — APPOINTMENT (OUTPATIENT)
Dept: INTERNAL MEDICINE | Facility: CLINIC | Age: 54
End: 2019-08-13
Payer: MEDICARE

## 2019-08-13 VITALS — HEIGHT: 70 IN | WEIGHT: 248 LBS | BODY MASS INDEX: 35.5 KG/M2

## 2019-08-13 VITALS — DIASTOLIC BLOOD PRESSURE: 68 MMHG | RESPIRATION RATE: 14 BRPM | SYSTOLIC BLOOD PRESSURE: 108 MMHG | HEART RATE: 67 BPM

## 2019-08-13 PROCEDURE — 36415 COLL VENOUS BLD VENIPUNCTURE: CPT

## 2019-08-13 PROCEDURE — G0439: CPT

## 2019-08-13 RX ORDER — CAL PHOS/BRINDAL BERRY 250-115MG
TABLET ORAL
Refills: 0 | Status: DISCONTINUED | COMMUNITY
End: 2019-08-13

## 2019-08-13 RX ORDER — CLOZAPINE 50 MG/1
50 TABLET ORAL
Refills: 0 | Status: DISCONTINUED | COMMUNITY
End: 2019-08-13

## 2019-08-13 RX ORDER — BUSPIRONE HYDROCHLORIDE 5 MG/1
5 TABLET ORAL
Refills: 0 | Status: DISCONTINUED | COMMUNITY
End: 2019-08-13

## 2019-08-13 RX ORDER — OLODATEROL RESPIMAT INHALATION SPRAY 2.5 UG/1
2.5 SPRAY, METERED RESPIRATORY (INHALATION) DAILY
Qty: 1 | Refills: 5 | Status: DISCONTINUED | COMMUNITY
Start: 2019-04-25 | End: 2019-08-13

## 2019-08-13 NOTE — REVIEW OF SYSTEMS
[Suicidal] : not suicidal [Insomnia] : insomnia [Depression] : no depression [Anxiety] : no anxiety [Negative] : Heme/Lymph

## 2019-08-13 NOTE — ASSESSMENT
[FreeTextEntry1] : psych follow up\par medical issues seem controllled\par refused tdap and pneumovax\par states had colonscopy in 2019

## 2019-08-13 NOTE — HEALTH RISK ASSESSMENT
[Good] : ~his/her~ current health as good [] : No [No] : No [No falls in past year] : Patient reported no falls in the past year [UnableToScreenReason] : sees psych for schizo [0] : 2) Feeling down, depressed, or hopeless: Not at all (0) [HIV test declined] : HIV test declined [Change in mental status noted] : No change in mental status noted [Hepatitis C test declined] : Hepatitis C test declined [Language] : denies difficulty with language [Learning/Retaining New Information] : denies difficulty learning/retaining new information [Behavior] : difficulty with behavior [Handling Complex Tasks] : denies difficulty handling complex tasks [Reasoning] : difficulty with reasoning [Spatial Ability and Orientation] : difficulty with spatial ability and orientation [Behavioral] : behavioral [On disability] : on disability [College] : College [Single] : single [High Risk Behavior] : no high risk behavior [Feels Safe at Home] : Feels safe at home [Reports changes in hearing] : Reports no changes in hearing [Reports normal functional visual acuity (ie: able to read med bottle)] : Reports poor functional visual acuity.  [Reports changes in vision] : Reports no changes in vision [Reports changes in dental health] : Reports no changes in dental health [Smoke Detector] : smoke detector [Carbon Monoxide Detector] : carbon monoxide detector [Safety elements used in home] : safety elements used in home [Guns at Home] : no guns at home [Travel to Developing Areas] : does not  travel to developing areas [de-identified] : group home [AdvancecareDate] : 8/13/19

## 2019-08-13 NOTE — HISTORY OF PRESENT ILLNESS
[FreeTextEntry1] : For CPE [de-identified] : For CPE\par patient is here for cpe\par patient has schizophrenia\par has been hospitalized for it and sees a psych \par he denies chest pain sob nvd, wants his hormones checked

## 2019-08-14 LAB
25(OH)D3 SERPL-MCNC: 38.4 NG/ML
ALBUMIN SERPL ELPH-MCNC: 4.7 G/DL
ALP BLD-CCNC: 28 U/L
ALT SERPL-CCNC: 31 U/L
ANION GAP SERPL CALC-SCNC: 12 MMOL/L
APPEARANCE: CLEAR
AST SERPL-CCNC: 27 U/L
BASOPHILS # BLD AUTO: 0.01 K/UL
BASOPHILS NFR BLD AUTO: 0.2 %
BILIRUB SERPL-MCNC: 0.2 MG/DL
BILIRUBIN URINE: NEGATIVE
BLOOD URINE: NEGATIVE
BUN SERPL-MCNC: 13 MG/DL
CALCIUM SERPL-MCNC: 10.5 MG/DL
CHLORIDE SERPL-SCNC: 105 MMOL/L
CHOLEST SERPL-MCNC: 105 MG/DL
CHOLEST/HDLC SERPL: 2.5 RATIO
CO2 SERPL-SCNC: 26 MMOL/L
COLOR: COLORLESS
CREAT SERPL-MCNC: 0.88 MG/DL
CREAT SPEC-SCNC: 24 MG/DL
EOSINOPHIL # BLD AUTO: 0.01 K/UL
EOSINOPHIL NFR BLD AUTO: 0.2 %
ESTIMATED AVERAGE GLUCOSE: 103 MG/DL
GLUCOSE QUALITATIVE U: NEGATIVE
GLUCOSE SERPL-MCNC: 115 MG/DL
HBA1C MFR BLD HPLC: 5.2 %
HCT VFR BLD CALC: 39.7 %
HDLC SERPL-MCNC: 42 MG/DL
HGB BLD-MCNC: 12.6 G/DL
IMM GRANULOCYTES NFR BLD AUTO: 0.5 %
KETONES URINE: NEGATIVE
LDLC SERPL CALC-MCNC: 48 MG/DL
LEUKOCYTE ESTERASE URINE: NEGATIVE
LYMPHOCYTES # BLD AUTO: 1.28 K/UL
LYMPHOCYTES NFR BLD AUTO: 21.3 %
MAN DIFF?: NORMAL
MCHC RBC-ENTMCNC: 27.4 PG
MCHC RBC-ENTMCNC: 31.7 GM/DL
MCV RBC AUTO: 86.3 FL
MICROALBUMIN 24H UR DL<=1MG/L-MCNC: <1.2 MG/DL
MICROALBUMIN/CREAT 24H UR-RTO: NORMAL MG/G
MONOCYTES # BLD AUTO: 0.57 K/UL
MONOCYTES NFR BLD AUTO: 9.5 %
NEUTROPHILS # BLD AUTO: 4.1 K/UL
NEUTROPHILS NFR BLD AUTO: 68.3 %
NITRITE URINE: NEGATIVE
PH URINE: 7
PLATELET # BLD AUTO: 182 K/UL
POTASSIUM SERPL-SCNC: 4.2 MMOL/L
PROLACTIN SERPL-MCNC: 59.2 NG/ML
PROT SERPL-MCNC: 6.7 G/DL
PROTEIN URINE: NEGATIVE
PSA SERPL-MCNC: 6.75 NG/ML
RBC # BLD: 4.6 M/UL
RBC # FLD: 13.6 %
SODIUM SERPL-SCNC: 143 MMOL/L
SPECIFIC GRAVITY URINE: 1.01
T4 FREE SERPL-MCNC: 1.1 NG/DL
TRIGL SERPL-MCNC: 77 MG/DL
TSH SERPL-ACNC: 1.28 UIU/ML
UROBILINOGEN URINE: NORMAL
WBC # FLD AUTO: 6 K/UL

## 2019-08-15 ENCOUNTER — CHART COPY (OUTPATIENT)
Age: 54
End: 2019-08-15

## 2019-08-17 LAB
TESTOST BND SERPL-MCNC: 8.5 PG/ML
TESTOST SERPL-MCNC: 246.3 NG/DL

## 2019-10-01 ENCOUNTER — APPOINTMENT (OUTPATIENT)
Dept: INTERNAL MEDICINE | Facility: CLINIC | Age: 54
End: 2019-10-01
Payer: MEDICARE

## 2019-10-01 VITALS — HEIGHT: 70 IN | BODY MASS INDEX: 36.36 KG/M2 | WEIGHT: 254 LBS

## 2019-10-01 VITALS — DIASTOLIC BLOOD PRESSURE: 67 MMHG | RESPIRATION RATE: 12 BRPM | SYSTOLIC BLOOD PRESSURE: 110 MMHG | HEART RATE: 70 BPM

## 2019-10-01 DIAGNOSIS — N13.8 BENIGN PROSTATIC HYPERPLASIA WITH LOWER URINARY TRACT SYMPMS: ICD-10-CM

## 2019-10-01 DIAGNOSIS — N40.1 BENIGN PROSTATIC HYPERPLASIA WITH LOWER URINARY TRACT SYMPMS: ICD-10-CM

## 2019-10-01 PROCEDURE — 99214 OFFICE O/P EST MOD 30 MIN: CPT

## 2019-10-01 NOTE — PHYSICAL EXAM
[No Acute Distress] : no acute distress [Well Nourished] : well nourished [Well Developed] : well developed [Well-Appearing] : well-appearing [Normal Sclera/Conjunctiva] : normal sclera/conjunctiva [PERRL] : pupils equal round and reactive to light [EOMI] : extraocular movements intact [Normal Oropharynx] : the oropharynx was normal [Normal Outer Ear/Nose] : the outer ears and nose were normal in appearance [No JVD] : no jugular venous distention [No Lymphadenopathy] : no lymphadenopathy [Supple] : supple [Thyroid Normal, No Nodules] : the thyroid was normal and there were no nodules present [No Respiratory Distress] : no respiratory distress  [No Accessory Muscle Use] : no accessory muscle use [Clear to Auscultation] : lungs were clear to auscultation bilaterally [Normal Rate] : normal rate  [Regular Rhythm] : with a regular rhythm [Normal S1, S2] : normal S1 and S2 [No Murmur] : no murmur heard [No Abdominal Bruit] : a ~M bruit was not heard ~T in the abdomen [No Carotid Bruits] : no carotid bruits [No Varicosities] : no varicosities [No Edema] : there was no peripheral edema [Pedal Pulses Present] : the pedal pulses are present [No Palpable Aorta] : no palpable aorta [Soft] : abdomen soft [No Extremity Clubbing/Cyanosis] : no extremity clubbing/cyanosis [Non Tender] : non-tender [Non-distended] : non-distended [No Masses] : no abdominal mass palpated [No HSM] : no HSM [Normal Bowel Sounds] : normal bowel sounds [Normal Posterior Cervical Nodes] : no posterior cervical lymphadenopathy [No Spinal Tenderness] : no spinal tenderness [Normal Anterior Cervical Nodes] : no anterior cervical lymphadenopathy [No CVA Tenderness] : no CVA  tenderness [No Joint Swelling] : no joint swelling [Grossly Normal Strength/Tone] : grossly normal strength/tone [No Rash] : no rash [No Focal Deficits] : no focal deficits [Coordination Grossly Intact] : coordination grossly intact [Normal Gait] : normal gait [Deep Tendon Reflexes (DTR)] : deep tendon reflexes were 2+ and symmetric [Normal Affect] : the affect was normal [Normal Insight/Judgement] : insight and judgment were intact

## 2019-10-01 NOTE — HISTORY OF PRESENT ILLNESS
[FreeTextEntry1] : follow up lab testing [de-identified] : follow up lab testing\par labs look good with exception of psa which was 6.75 and he sees urology for it\par he has no chest pain sob nvd or palpitations'\par has been taking his meds for mental illness and that has kept him stable\par he does not have diabetes as per lab test

## 2019-10-01 NOTE — ASSESSMENT
[FreeTextEntry1] : pscyh stable\par psa elevated followup with urology\par no evidence of dm\par labs ok\par last testosterone ok\par continue meds\par follow up 6 months

## 2019-10-15 ENCOUNTER — APPOINTMENT (OUTPATIENT)
Dept: INTERNAL MEDICINE | Facility: CLINIC | Age: 54
End: 2019-10-15
Payer: MEDICARE

## 2019-10-15 VITALS
SYSTOLIC BLOOD PRESSURE: 124 MMHG | RESPIRATION RATE: 14 BRPM | HEART RATE: 65 BPM | BODY MASS INDEX: 36.51 KG/M2 | WEIGHT: 255 LBS | DIASTOLIC BLOOD PRESSURE: 68 MMHG | HEIGHT: 70 IN

## 2019-10-15 DIAGNOSIS — R09.82 POSTNASAL DRIP: ICD-10-CM

## 2019-10-15 PROCEDURE — 99214 OFFICE O/P EST MOD 30 MIN: CPT

## 2019-10-15 RX ORDER — TRAZODONE HYDROCHLORIDE 100 MG/1
100 TABLET ORAL
Qty: 30 | Refills: 4 | Status: DISCONTINUED | COMMUNITY
End: 2019-10-15

## 2019-10-15 NOTE — ASSESSMENT
[FreeTextEntry1] : likely asthma post nasal drip induced, flonase daily\par used inhalers as indicated\par flonase\par psych stable for now sees psychiatrist\par follow up prn

## 2019-10-15 NOTE — HISTORY OF PRESENT ILLNESS
[FreeTextEntry1] : cough [de-identified] : patient here for cough\par has been having post nasal drip and coughing\par no fever no sob no nvd\par stopped using his inhalers and nebulizers because they caused him to have buzzing in the ear\par he had uri  a week ago\par has bipolar/schizoaffective disorder and sees psychiatrist

## 2019-10-17 ENCOUNTER — RX RENEWAL (OUTPATIENT)
Age: 54
End: 2019-10-17

## 2019-10-21 ENCOUNTER — MEDICATION RENEWAL (OUTPATIENT)
Age: 54
End: 2019-10-21

## 2020-01-07 ENCOUNTER — APPOINTMENT (OUTPATIENT)
Dept: INTERNAL MEDICINE | Facility: CLINIC | Age: 55
End: 2020-01-07
Payer: MEDICARE

## 2020-01-07 VITALS
RESPIRATION RATE: 12 BRPM | HEIGHT: 70 IN | DIASTOLIC BLOOD PRESSURE: 78 MMHG | WEIGHT: 266 LBS | SYSTOLIC BLOOD PRESSURE: 118 MMHG | BODY MASS INDEX: 38.08 KG/M2 | HEART RATE: 70 BPM

## 2020-01-07 PROCEDURE — 99214 OFFICE O/P EST MOD 30 MIN: CPT

## 2020-01-07 NOTE — PHYSICAL EXAM
[No Acute Distress] : no acute distress [Well Nourished] : well nourished [Well Developed] : well developed [Well-Appearing] : well-appearing [Normal Sclera/Conjunctiva] : normal sclera/conjunctiva [PERRL] : pupils equal round and reactive to light [EOMI] : extraocular movements intact [Normal Outer Ear/Nose] : the outer ears and nose were normal in appearance [Normal Oropharynx] : the oropharynx was normal [No Lymphadenopathy] : no lymphadenopathy [No JVD] : no jugular venous distention [Supple] : supple [Thyroid Normal, No Nodules] : the thyroid was normal and there were no nodules present [No Respiratory Distress] : no respiratory distress  [No Accessory Muscle Use] : no accessory muscle use [Clear to Auscultation] : lungs were clear to auscultation bilaterally [Normal Rate] : normal rate  [Regular Rhythm] : with a regular rhythm [Normal S1, S2] : normal S1 and S2 [No Carotid Bruits] : no carotid bruits [No Murmur] : no murmur heard [No Varicosities] : no varicosities [No Abdominal Bruit] : a ~M bruit was not heard ~T in the abdomen [Pedal Pulses Present] : the pedal pulses are present [No Edema] : there was no peripheral edema [No Palpable Aorta] : no palpable aorta [Soft] : abdomen soft [No Extremity Clubbing/Cyanosis] : no extremity clubbing/cyanosis [Non-distended] : non-distended [Non Tender] : non-tender [No Masses] : no abdominal mass palpated [No HSM] : no HSM [Normal Bowel Sounds] : normal bowel sounds [Normal Posterior Cervical Nodes] : no posterior cervical lymphadenopathy [No CVA Tenderness] : no CVA  tenderness [Normal Anterior Cervical Nodes] : no anterior cervical lymphadenopathy [No Spinal Tenderness] : no spinal tenderness [No Joint Swelling] : no joint swelling [Grossly Normal Strength/Tone] : grossly normal strength/tone [No Rash] : no rash [Coordination Grossly Intact] : coordination grossly intact [No Focal Deficits] : no focal deficits [Normal Gait] : normal gait [Deep Tendon Reflexes (DTR)] : deep tendon reflexes were 2+ and symmetric [Normal Affect] : the affect was normal [Normal Insight/Judgement] : insight and judgment were intact

## 2020-01-07 NOTE — ASSESSMENT
[FreeTextEntry1] : needs psych follow up to discuss his meds\par check lpids\par should not try to manage his own psych meds it would not be helpful

## 2020-01-07 NOTE — HISTORY OF PRESENT ILLNESS
[FreeTextEntry1] : for follow up medical issues [de-identified] : follow up medical issues\par patient needs lipid checked he wants to go fasting\par he wants a new psychiatrist because he does not want to be on all the meds he is\par but they have been keeping his psych issues in check\par he has no chest pain sob nvd or palpiations\par he is not hallucinating

## 2020-01-13 ENCOUNTER — RESULT CHARGE (OUTPATIENT)
Age: 55
End: 2020-01-13

## 2020-01-14 ENCOUNTER — APPOINTMENT (OUTPATIENT)
Dept: PULMONOLOGY | Facility: CLINIC | Age: 55
End: 2020-01-14
Payer: MEDICARE

## 2020-01-14 VITALS — WEIGHT: 274 LBS | HEIGHT: 70 IN | BODY MASS INDEX: 39.22 KG/M2

## 2020-01-14 VITALS
RESPIRATION RATE: 16 BRPM | OXYGEN SATURATION: 98 % | HEART RATE: 80 BPM | SYSTOLIC BLOOD PRESSURE: 122 MMHG | DIASTOLIC BLOOD PRESSURE: 72 MMHG

## 2020-01-14 DIAGNOSIS — R76.11 NONSPECIFIC REACTION TO TUBERCULIN SKIN TEST W/OUT ACTIVE TUBERCULOSIS: ICD-10-CM

## 2020-01-14 PROCEDURE — 94060 EVALUATION OF WHEEZING: CPT

## 2020-01-14 PROCEDURE — 94664 DEMO&/EVAL PT USE INHALER: CPT | Mod: 59

## 2020-01-14 PROCEDURE — 99214 OFFICE O/P EST MOD 30 MIN: CPT | Mod: 25

## 2020-01-14 RX ORDER — FLUTICASONE PROPIONATE 50 UG/1
50 SPRAY, METERED NASAL DAILY
Qty: 1 | Refills: 4 | Status: DISCONTINUED | COMMUNITY
Start: 2019-10-15 | End: 2020-01-14

## 2020-01-14 RX ORDER — TESTOSTERONE 16.2 MG/G
40.5 MG/2.5GM GEL TRANSDERMAL
Refills: 0 | Status: DISCONTINUED | COMMUNITY
End: 2020-01-14

## 2020-01-14 NOTE — PHYSICAL EXAM
[Normal Conjunctiva] : the conjunctiva exhibited no abnormalities [General Appearance - Well Developed] : well developed [General Appearance - Well Nourished] : well nourished [Neck Appearance] : the appearance of the neck was normal [Normal Oropharynx] : normal oropharynx [Neck Cervical Mass (___cm)] : no neck mass was observed [Jugular Venous Distention Increased] : there was no jugular-venous distention [Thyroid Diffuse Enlargement] : the thyroid was not enlarged [Heart Rate And Rhythm] : heart rate and rhythm were normal [Heart Sounds] : normal S1 and S2 [Murmurs] : no murmurs present [Arterial Pulses Normal] : the arterial pulses were normal [Edema] : no peripheral edema present [] : no respiratory distress [Auscultation Breath Sounds / Voice Sounds] : lungs were clear to auscultation bilaterally [Exaggerated Use Of Accessory Muscles For Inspiration] : no accessory muscle use [Respiration, Rhythm And Depth] : normal respiratory rhythm and effort [Abnormal Walk] : normal gait [Abdomen Soft] : soft [Nail Clubbing] : no clubbing of the fingernails [Cyanosis, Localized] : no localized cyanosis [Skin Turgor] : normal skin turgor [Oriented To Time, Place, And Person] : oriented to person, place, and time

## 2020-01-14 NOTE — REVIEW OF SYSTEMS
[Sore Throat] : sore throat [As Noted in HPI] : as noted in HPI [Negative] : Endocrine [de-identified] : BIB on CPAP [de-identified] : Bipolar

## 2020-02-18 NOTE — PLAN
Ultrasound completed....SP   [FreeTextEntry1] : care plan reviewed  advised follow up with Psych and Needs visit for Labs \par RTC 2 weeks

## 2020-03-03 ENCOUNTER — APPOINTMENT (OUTPATIENT)
Dept: INTERNAL MEDICINE | Facility: CLINIC | Age: 55
End: 2020-03-03

## 2020-03-03 ENCOUNTER — OUTPATIENT (OUTPATIENT)
Dept: OUTPATIENT SERVICES | Facility: HOSPITAL | Age: 55
LOS: 1 days | End: 2020-03-03
Payer: MEDICARE

## 2020-03-03 PROCEDURE — 70551 MRI BRAIN STEM W/O DYE: CPT | Mod: 26

## 2020-03-04 LAB — PSA SERPL-MCNC: 2.63 NG/ML

## 2020-03-05 ENCOUNTER — RX CHANGE (OUTPATIENT)
Age: 55
End: 2020-03-05

## 2020-05-21 ENCOUNTER — APPOINTMENT (OUTPATIENT)
Dept: INTERNAL MEDICINE | Facility: CLINIC | Age: 55
End: 2020-05-21

## 2020-06-17 ENCOUNTER — APPOINTMENT (OUTPATIENT)
Dept: PULMONOLOGY | Facility: CLINIC | Age: 55
End: 2020-06-17
Payer: MEDICARE

## 2020-06-17 VITALS — BODY MASS INDEX: 40.03 KG/M2 | WEIGHT: 279 LBS

## 2020-06-17 VITALS — OXYGEN SATURATION: 95 % | HEART RATE: 84 BPM

## 2020-06-17 PROCEDURE — 99213 OFFICE O/P EST LOW 20 MIN: CPT

## 2020-06-17 NOTE — REVIEW OF SYSTEMS
[Nasal Congestion] : nasal congestion [TextBox_134] : Bipolar/schizoaffective [Negative] : Endocrine

## 2020-06-17 NOTE — PHYSICAL EXAM
[Normal Appearance] : normal appearance [Normal Oropharynx] : normal oropharynx [No Acute Distress] : no acute distress [Normal Rate/Rhythm] : normal rate/rhythm [No Neck Mass] : no neck mass [No Resp Distress] : no resp distress [Normal S1, S2] : normal s1, s2 [No Murmurs] : no murmurs [Clear to Auscultation Bilaterally] : clear to auscultation bilaterally [No Abnormalities] : no abnormalities [Benign] : benign [No Clubbing] : no clubbing [No Cyanosis] : no cyanosis [Normal Gait] : normal gait [FROM] : FROM [No Edema] : no edema [Normal Color/ Pigmentation] : normal color/ pigmentation [Oriented x3] : oriented x3 [No Focal Deficits] : no focal deficits

## 2020-08-18 ENCOUNTER — APPOINTMENT (OUTPATIENT)
Dept: INTERNAL MEDICINE | Facility: CLINIC | Age: 55
End: 2020-08-18
Payer: MEDICARE

## 2020-08-18 ENCOUNTER — NON-APPOINTMENT (OUTPATIENT)
Age: 55
End: 2020-08-18

## 2020-08-18 VITALS
TEMPERATURE: 97.3 F | SYSTOLIC BLOOD PRESSURE: 130 MMHG | HEART RATE: 85 BPM | OXYGEN SATURATION: 97 % | BODY MASS INDEX: 41.23 KG/M2 | DIASTOLIC BLOOD PRESSURE: 60 MMHG | RESPIRATION RATE: 16 BRPM | HEIGHT: 70 IN | WEIGHT: 288 LBS

## 2020-08-18 DIAGNOSIS — Z00.00 ENCOUNTER FOR GENERAL ADULT MEDICAL EXAMINATION W/OUT ABNORMAL FINDINGS: ICD-10-CM

## 2020-08-18 PROCEDURE — 93000 ELECTROCARDIOGRAM COMPLETE: CPT

## 2020-08-18 PROCEDURE — G0439: CPT

## 2020-08-18 RX ORDER — ATORVASTATIN CALCIUM 10 MG/1
10 TABLET, FILM COATED ORAL
Qty: 90 | Refills: 2 | Status: DISCONTINUED | COMMUNITY
Start: 2018-01-27 | End: 2020-08-18

## 2020-08-18 NOTE — HISTORY OF PRESENT ILLNESS
[FreeTextEntry1] : For Follow up and cpe [de-identified] : For CPE\par has been well\par has no new issues\par he has history of psychiatric issues and is seeing and psychiatrist\par he is on invega and lipid lowering agent

## 2020-08-18 NOTE — ASSESSMENT
[FreeTextEntry1] : ekg ok\par psych stable\par obesity stable\par does not want to take lipitor and chol is low till try off lipitor but continue fenofibrate\par repeat in 4months\par see psych for follow up

## 2020-08-18 NOTE — HEALTH RISK ASSESSMENT
[Excellent] : ~his/her~ current health as excellent [No] : No [No falls in past year] : Patient reported no falls in the past year [Alone] : lives alone [College] : College [Feels Safe at Home] : Feels safe at home [Fully functional (bathing, dressing, toileting, transferring, walking, feeding)] : Fully functional (bathing, dressing, toileting, transferring, walking, feeding) [Fully functional (using the telephone, shopping, preparing meals, housekeeping, doing laundry, using] : Fully functional and needs no help or supervision to perform IADLs (using the telephone, shopping, preparing meals, housekeeping, doing laundry, using transportation, managing medications and managing finances) [Reports changes in hearing] : Reports changes in hearing [Smoke Detector] : smoke detector [Carbon Monoxide Detector] : carbon monoxide detector [Safety elements used in home] : safety elements used in home [Seat Belt] :  uses seat belt [With Patient/Caregiver] : With Patient/Caregiver [Name: ___] : Health Care Proxy's Name: [unfilled]  [Relationship: ___] : Relationship: [unfilled] [] : No [Language] : denies difficulty with language [Change in mental status noted] : No change in mental status noted [Behavior] : denies difficulty with behavior [Learning/Retaining New Information] : denies difficulty learning/retaining new information [Handling Complex Tasks] : denies difficulty handling complex tasks [Spatial Ability and Orientation] : denies difficulty with spatial ability and orientation [Reasoning] : denies difficulty with reasoning [Sexually Active] : not sexually active [High Risk Behavior] : no high risk behavior [Reports changes in vision] : Reports no changes in vision [Guns at Home] : no guns at home [Reports changes in dental health] : Reports no changes in dental health [Sunscreen] : does not use sunscreen [Travel to Developing Areas] : does not  travel to developing areas [TB Exposure] : is not being exposed to tuberculosis [ColonoscopyDate] : 2018 [de-identified] : saw ent [AdvancecareDate] : 8/18/20

## 2020-08-18 NOTE — PHYSICAL EXAM
[No Acute Distress] : no acute distress [Well Nourished] : well nourished [Well Developed] : well developed [Well-Appearing] : well-appearing [PERRL] : pupils equal round and reactive to light [Normal Sclera/Conjunctiva] : normal sclera/conjunctiva [Normal Outer Ear/Nose] : the outer ears and nose were normal in appearance [EOMI] : extraocular movements intact [Normal Oropharynx] : the oropharynx was normal [No JVD] : no jugular venous distention [No Lymphadenopathy] : no lymphadenopathy [Supple] : supple [Thyroid Normal, No Nodules] : the thyroid was normal and there were no nodules present [No Respiratory Distress] : no respiratory distress  [No Accessory Muscle Use] : no accessory muscle use [Normal Rate] : normal rate  [Clear to Auscultation] : lungs were clear to auscultation bilaterally [Normal S1, S2] : normal S1 and S2 [Regular Rhythm] : with a regular rhythm [No Abdominal Bruit] : a ~M bruit was not heard ~T in the abdomen [No Murmur] : no murmur heard [No Carotid Bruits] : no carotid bruits [Pedal Pulses Present] : the pedal pulses are present [No Varicosities] : no varicosities [No Edema] : there was no peripheral edema [No Palpable Aorta] : no palpable aorta [No Extremity Clubbing/Cyanosis] : no extremity clubbing/cyanosis [Soft] : abdomen soft [Non Tender] : non-tender [Non-distended] : non-distended [Normal Bowel Sounds] : normal bowel sounds [No Masses] : no abdominal mass palpated [No HSM] : no HSM [Normal Posterior Cervical Nodes] : no posterior cervical lymphadenopathy [Normal Anterior Cervical Nodes] : no anterior cervical lymphadenopathy [No CVA Tenderness] : no CVA  tenderness [No Spinal Tenderness] : no spinal tenderness [No Joint Swelling] : no joint swelling [Grossly Normal Strength/Tone] : grossly normal strength/tone [No Rash] : no rash [No Focal Deficits] : no focal deficits [Coordination Grossly Intact] : coordination grossly intact [Normal Gait] : normal gait [Normal Affect] : the affect was normal [Normal Insight/Judgement] : insight and judgment were intact

## 2020-10-15 ENCOUNTER — APPOINTMENT (OUTPATIENT)
Dept: NEUROSURGERY | Facility: CLINIC | Age: 55
End: 2020-10-15

## 2020-12-01 ENCOUNTER — APPOINTMENT (OUTPATIENT)
Dept: PULMONOLOGY | Facility: CLINIC | Age: 55
End: 2020-12-01
Payer: MEDICARE

## 2020-12-01 VITALS
SYSTOLIC BLOOD PRESSURE: 120 MMHG | DIASTOLIC BLOOD PRESSURE: 70 MMHG | HEART RATE: 62 BPM | OXYGEN SATURATION: 96 % | RESPIRATION RATE: 16 BRPM | WEIGHT: 276 LBS | BODY MASS INDEX: 39.6 KG/M2

## 2020-12-01 DIAGNOSIS — E66.9 OBESITY, UNSPECIFIED: ICD-10-CM

## 2020-12-01 PROCEDURE — 99213 OFFICE O/P EST LOW 20 MIN: CPT

## 2020-12-01 RX ORDER — MINERAL,LANOLIN OILS/PROP GLYC
LOTION (ML) TOPICAL
Refills: 0 | Status: DISCONTINUED | COMMUNITY
End: 2020-12-01

## 2020-12-01 NOTE — PHYSICAL EXAM
[No Acute Distress] : no acute distress [Normal Oropharynx] : normal oropharynx [Normal Appearance] : normal appearance [No Neck Mass] : no neck mass [Normal Rate/Rhythm] : normal rate/rhythm [Normal S1, S2] : normal s1, s2 [No Murmurs] : no murmurs [No Resp Distress] : no resp distress [No Acc Muscle Use] : no acc muscle use [Normal Rhythm and Effort] : normal rhythm and effort [Clear to Auscultation Bilaterally] : clear to auscultation bilaterally [No Abnormalities] : no abnormalities [Benign] : benign [Normal Gait] : normal gait [No Clubbing] : no clubbing [No Cyanosis] : no cyanosis [No Edema] : no edema [FROM] : FROM [Normal Color/ Pigmentation] : normal color/ pigmentation [No Focal Deficits] : no focal deficits [Oriented x3] : oriented x3

## 2020-12-01 NOTE — REVIEW OF SYSTEMS
[Nasal Congestion] : nasal congestion [Negative] : Endocrine [TextBox_134] : Bipolar/schizoaffective

## 2020-12-15 ENCOUNTER — APPOINTMENT (OUTPATIENT)
Dept: INTERNAL MEDICINE | Facility: CLINIC | Age: 55
End: 2020-12-15
Payer: MEDICARE

## 2020-12-15 VITALS
RESPIRATION RATE: 12 BRPM | SYSTOLIC BLOOD PRESSURE: 116 MMHG | HEIGHT: 70 IN | DIASTOLIC BLOOD PRESSURE: 78 MMHG | WEIGHT: 276 LBS | HEART RATE: 68 BPM | BODY MASS INDEX: 39.51 KG/M2

## 2020-12-15 PROCEDURE — 99214 OFFICE O/P EST MOD 30 MIN: CPT

## 2020-12-15 NOTE — HISTORY OF PRESENT ILLNESS
[FreeTextEntry1] : follow up lab testing [de-identified] : follow up labs for lipid\par has been taking his chol meds\par has felt well\par he has schizophrenia and lives alone but is managing well\par he has no chest pain sob nvd or palpitations

## 2020-12-15 NOTE — ASSESSMENT
[FreeTextEntry1] : psych stable, continue pscyh meds\par lipids excellent on meds, alt 71 will follow\par asthma stable\par continue meds\par follow up 4 monmt

## 2020-12-21 ENCOUNTER — EMERGENCY (EMERGENCY)
Facility: HOSPITAL | Age: 55
LOS: 1 days | End: 2020-12-21
Admitting: EMERGENCY MEDICINE
Payer: MEDICARE

## 2020-12-21 PROCEDURE — 99282 EMERGENCY DEPT VISIT SF MDM: CPT

## 2020-12-22 ENCOUNTER — NON-APPOINTMENT (OUTPATIENT)
Age: 55
End: 2020-12-22

## 2020-12-23 RX ORDER — SALMETEROL XINAFOATE 50 UG/1
50 POWDER, METERED ORAL; RESPIRATORY (INHALATION)
Qty: 3 | Refills: 3 | Status: DISCONTINUED | COMMUNITY
Start: 2018-10-16 | End: 2020-12-23

## 2020-12-24 ENCOUNTER — NON-APPOINTMENT (OUTPATIENT)
Age: 55
End: 2020-12-24

## 2021-02-23 ENCOUNTER — NON-APPOINTMENT (OUTPATIENT)
Age: 56
End: 2021-02-23

## 2021-03-16 ENCOUNTER — APPOINTMENT (OUTPATIENT)
Dept: INTERNAL MEDICINE | Facility: CLINIC | Age: 56
End: 2021-03-16
Payer: MEDICARE

## 2021-03-16 VITALS
BODY MASS INDEX: 42.76 KG/M2 | HEART RATE: 72 BPM | SYSTOLIC BLOOD PRESSURE: 140 MMHG | DIASTOLIC BLOOD PRESSURE: 78 MMHG | WEIGHT: 298 LBS

## 2021-03-16 PROCEDURE — 99214 OFFICE O/P EST MOD 30 MIN: CPT

## 2021-03-16 NOTE — ASSESSMENT
[FreeTextEntry1] : hernia surgical evaluation, avoid heavy lifting\par hld very good\par trigs excellent\par bp systolic slightly elevated today\par schizoaffective disorder, psych follow up\par work on weight loss\par asthma stable\par

## 2021-03-16 NOTE — HISTORY OF PRESENT ILLNESS
[FreeTextEntry1] : follow up asthma\par follow up hld\par follow psych issues [de-identified] : patient here for follow up \par has hernia pain he will be seeing surgeon next week\par he has hld but last chol is excellent\par he has asthma but has not needed to use nebulizer much\par he has psych history and has been relatively stable\par

## 2021-03-19 NOTE — REVIEW OF SYSTEMS
4-6 weeks [Fever] : no fever [Chills] : no chills [Fatigue] : no fatigue [Night Sweats] : no night sweats [Recent Change In Weight] : ~T no recent weight change [Pain] : no pain [Discharge] : no discharge [Redness] : no redness [Dryness] : dryness [Itching] : no itching [Vision Problems] : vision problems [Earache] : no earache [Hearing Loss] : no hearing loss [Postnasal Drip] : no postnasal drip [Nosebleeds] : no nosebleeds [Nasal Discharge] : no nasal discharge [Sore Throat] : no sore throat [Chest Pain] : no chest pain [Hoarseness] : no hoarseness [Palpitations] : no palpitations [Claudication] : no  leg claudication [Lower Ext Edema] : no lower extremity edema [Orthopena] : no orthopnea [Paroysmal Nocturnal Dyspnea] : no paroysmal nocturnal dyspnea [Shortness Of Breath] : no shortness of breath [Wheezing] : no wheezing [Cough] : no cough [Abdominal Pain] : no abdominal pain [Dyspnea on Exertion] : not dyspnea on exertion [Nausea] : no nausea [Constipation] : no constipation [Diarrhea] : no diarrhea [Vomiting] : no vomiting [Heartburn] : no heartburn [Melena] : no melena [Dysuria] : no dysuria [Hesitancy] : no hesitancy [Incontinence] : no incontinence [Nocturia] : no nocturia [Hematuria] : no hematuria [Frequency] : no frequency [Joint Pain] : no joint pain [Joint Stiffness] : no joint stiffness [Muscle Pain] : no muscle pain [Muscle Weakness] : no muscle weakness [Back Pain] : no back pain [Joint Swelling] : no joint swelling [Itching] : no itching [Hair Changes] : no hair changes [Skin Rash] : no skin rash [Dizziness] : no dizziness [Headache] : no headache [Confusion] : no confusion [Unsteady Walk] : no ataxia [Fainting] : no fainting [Memory Loss] : no memory loss [Suicidal] : not suicidal [Depression] : depression [Anxiety] : no anxiety [Insomnia] : no insomnia [Easy Bruising] : no easy bruising [Easy Bleeding] : no easy bleeding [Swollen Glands] : no swollen glands [Negative] : Heme/Lymph [FreeTextEntry7] : second to hemorroids [de-identified] : hallucinating

## 2021-03-30 ENCOUNTER — APPOINTMENT (OUTPATIENT)
Dept: PULMONOLOGY | Facility: CLINIC | Age: 56
End: 2021-03-30
Payer: MEDICARE

## 2021-03-30 VITALS
HEART RATE: 71 BPM | WEIGHT: 300 LBS | OXYGEN SATURATION: 98 % | DIASTOLIC BLOOD PRESSURE: 80 MMHG | BODY MASS INDEX: 44.43 KG/M2 | TEMPERATURE: 98 F | SYSTOLIC BLOOD PRESSURE: 128 MMHG | HEIGHT: 69 IN | RESPIRATION RATE: 14 BRPM

## 2021-03-30 DIAGNOSIS — Z01.811 ENCOUNTER FOR PREPROCEDURAL RESPIRATORY EXAMINATION: ICD-10-CM

## 2021-03-30 PROCEDURE — 99214 OFFICE O/P EST MOD 30 MIN: CPT

## 2021-03-30 RX ORDER — PALIPERIDONE PALMITATE 156 MG/ML
156 INJECTION INTRAMUSCULAR
Refills: 0 | Status: DISCONTINUED | COMMUNITY
Start: 2019-08-13 | End: 2021-03-30

## 2021-03-30 NOTE — CONSULT LETTER
[Dear  ___] : Dear  [unfilled], [Consult Letter:] : I had the pleasure of evaluating your patient, [unfilled]. [Please see my note below.] : Please see my note below. [Consult Closing:] : Thank you very much for allowing me to participate in the care of this patient.  If you have any questions, please do not hesitate to contact me. [Sincerely,] : Sincerely, [FreeTextEntry3] : Seven Forman MD\par  [___] : [unfilled]

## 2021-03-30 NOTE — REVIEW OF SYSTEMS
[Nasal Congestion] : nasal congestion [Abdominal Pain] : abdominal pain [Negative] : Endocrine [TextBox_134] : Bipolar/schizoaffective

## 2021-04-13 ENCOUNTER — NON-APPOINTMENT (OUTPATIENT)
Age: 56
End: 2021-04-13

## 2021-04-13 ENCOUNTER — APPOINTMENT (OUTPATIENT)
Dept: INTERNAL MEDICINE | Facility: CLINIC | Age: 56
End: 2021-04-13
Payer: MEDICARE

## 2021-04-13 VITALS — WEIGHT: 296 LBS | BODY MASS INDEX: 43.71 KG/M2

## 2021-04-13 VITALS
BODY MASS INDEX: 43.84 KG/M2 | SYSTOLIC BLOOD PRESSURE: 120 MMHG | DIASTOLIC BLOOD PRESSURE: 82 MMHG | HEART RATE: 68 BPM | RESPIRATION RATE: 12 BRPM | HEIGHT: 69 IN | WEIGHT: 296 LBS

## 2021-04-13 DIAGNOSIS — Z01.818 ENCOUNTER FOR OTHER PREPROCEDURAL EXAMINATION: ICD-10-CM

## 2021-04-13 DIAGNOSIS — K43.9 VENTRAL HERNIA W/OUT OBSTRUCTION OR GANGRENE: ICD-10-CM

## 2021-04-13 DIAGNOSIS — Z99.89 OBSTRUCTIVE SLEEP APNEA (ADULT) (PEDIATRIC): ICD-10-CM

## 2021-04-13 DIAGNOSIS — G47.33 OBSTRUCTIVE SLEEP APNEA (ADULT) (PEDIATRIC): ICD-10-CM

## 2021-04-13 PROCEDURE — 99214 OFFICE O/P EST MOD 30 MIN: CPT

## 2021-04-13 NOTE — ASSESSMENT
[Patient Optimized for Surgery] : Patient optimized for surgery [No Further Testing Recommended] : no further testing recommended [FreeTextEntry4] : Patient is medical stable for planned procedure.  Has bilateral edema of leg which are chronic and not likely to be dvt.  Will send for dopplers.\par Patient has extensive psych history but has been at baseline.  He has no contraindications to planned procedure.  [FreeTextEntry7] : hold aspirin continue usual meds

## 2021-04-13 NOTE — RESULTS/DATA
[] : results reviewed [de-identified] : normal [de-identified] : normal [de-identified] : normal [de-identified] : nsr no acute chagnes

## 2021-04-13 NOTE — PHYSICAL EXAM
[No Acute Distress] : no acute distress [Well Nourished] : well nourished [Well Developed] : well developed [Well-Appearing] : well-appearing [Normal Sclera/Conjunctiva] : normal sclera/conjunctiva [PERRL] : pupils equal round and reactive to light [EOMI] : extraocular movements intact [Normal Outer Ear/Nose] : the outer ears and nose were normal in appearance [Normal Oropharynx] : the oropharynx was normal [No JVD] : no jugular venous distention [No Lymphadenopathy] : no lymphadenopathy [Supple] : supple [Thyroid Normal, No Nodules] : the thyroid was normal and there were no nodules present [No Respiratory Distress] : no respiratory distress  [No Accessory Muscle Use] : no accessory muscle use [Clear to Auscultation] : lungs were clear to auscultation bilaterally [Normal Rate] : normal rate  [Regular Rhythm] : with a regular rhythm [Normal S1, S2] : normal S1 and S2 [No Murmur] : no murmur heard [No Carotid Bruits] : no carotid bruits [No Abdominal Bruit] : a ~M bruit was not heard ~T in the abdomen [No Varicosities] : no varicosities [Pedal Pulses Present] : the pedal pulses are present [No Edema] : there was no peripheral edema [No Palpable Aorta] : no palpable aorta [No Extremity Clubbing/Cyanosis] : no extremity clubbing/cyanosis [Soft] : abdomen soft [Non Tender] : non-tender [Non-distended] : non-distended [No Masses] : no abdominal mass palpated [No HSM] : no HSM [Normal Bowel Sounds] : normal bowel sounds [Normal Posterior Cervical Nodes] : no posterior cervical lymphadenopathy [Normal Anterior Cervical Nodes] : no anterior cervical lymphadenopathy [No CVA Tenderness] : no CVA  tenderness [No Spinal Tenderness] : no spinal tenderness [No Joint Swelling] : no joint swelling [Grossly Normal Strength/Tone] : grossly normal strength/tone [No Rash] : no rash [Coordination Grossly Intact] : coordination grossly intact [No Focal Deficits] : no focal deficits [Normal Gait] : normal gait [Deep Tendon Reflexes (DTR)] : deep tendon reflexes were 2+ and symmetric [Normal Affect] : the affect was normal [Normal Insight/Judgement] : insight and judgment were intact [de-identified] : bilateral edema

## 2021-04-13 NOTE — HISTORY OF PRESENT ILLNESS
[No Pertinent Cardiac History] : no history of aortic stenosis, atrial fibrillation, coronary artery disease, recent myocardial infarction, or implantable device/pacemaker [No Pertinent Pulmonary History] : no history of asthma, COPD, sleep apnea, or smoking [Asthma] : asthma [Sleep Apnea] : sleep apnea [No Adverse Anesthesia Reaction] : no adverse anesthesia reaction in self or family member [Aortic Stenosis] : no aortic stenosis [Atrial Fibrillation] : no atrial fibrillation [Coronary Artery Disease] : no coronary artery disease [Recent Myocardial Infarction] : no recent myocardial infarction [Implantable Device/Pacemaker] : no implantable device/pacemaker [COPD] : no COPD [Family Member] : no family member with adverse anesthesia reaction/sudden death [Self] : no previous adverse anesthesia reaction [Chronic Anticoagulation] : no chronic anticoagulation [Chronic Kidney Disease] : no chronic kidney disease [Diabetes] : no diabetes [FreeTextEntry1] : Ventral Hernia [FreeTextEntry2] : 4/15/21 [FreeTextEntry3] : Dr. Endy Polo [FreeTextEntry4] : Patient with history of schizophrenia, BIB, asthma and hld.  He will have ventral hernia repair.  He is concerned today about having a blood clot\par due to chronic leg swelling

## 2021-04-13 NOTE — ADDENDUM
[FreeTextEntry1] : Called by radiologist\par has dvt right lower extremity\par will need NOAC for 6 months\par he is not cleared for surgery\par as could cause embolism\par to start Xarelto 15 mg bid for 21 days then one a day 20 mg for 6months\par follow up 2 to 3 weeks\par patient will be notified today\par surgeon will be notified to cancel surgery

## 2021-04-14 ENCOUNTER — APPOINTMENT (OUTPATIENT)
Dept: INTERNAL MEDICINE | Facility: CLINIC | Age: 56
End: 2021-04-14

## 2021-04-14 ENCOUNTER — NON-APPOINTMENT (OUTPATIENT)
Age: 56
End: 2021-04-14

## 2021-04-27 ENCOUNTER — RX RENEWAL (OUTPATIENT)
Age: 56
End: 2021-04-27

## 2021-04-30 ENCOUNTER — APPOINTMENT (OUTPATIENT)
Dept: INTERNAL MEDICINE | Facility: CLINIC | Age: 56
End: 2021-04-30
Payer: MEDICARE

## 2021-04-30 VITALS
WEIGHT: 296 LBS | RESPIRATION RATE: 14 BRPM | HEART RATE: 72 BPM | BODY MASS INDEX: 43.71 KG/M2 | DIASTOLIC BLOOD PRESSURE: 82 MMHG | SYSTOLIC BLOOD PRESSURE: 118 MMHG

## 2021-04-30 PROCEDURE — 99214 OFFICE O/P EST MOD 30 MIN: CPT

## 2021-04-30 RX ORDER — RIVAROXABAN 15 MG/1
15 TABLET, FILM COATED ORAL
Qty: 42 | Refills: 0 | Status: DISCONTINUED | COMMUNITY
Start: 2021-04-13 | End: 2021-04-30

## 2021-04-30 NOTE — HISTORY OF PRESENT ILLNESS
[FreeTextEntry1] : follow up left leg dvt [de-identified] : patient with left leg dvt\par is taking his xarelto\par not sure how he got the dvt\par he has no chest pain sob nvd or palpitations\par has been taking all his meds\par has schizophrena\par

## 2021-04-30 NOTE — ASSESSMENT
[FreeTextEntry1] : dvt xarelto 20\par repeat sono in 3months\par unprovoked so not sure if will need 3 or 6 months\par schizoafftective stable\par trigs stable\par bp stable\par follow up 3 months

## 2021-04-30 NOTE — PHYSICAL EXAM
[No Acute Distress] : no acute distress [Well Nourished] : well nourished [Well Developed] : well developed [Well-Appearing] : well-appearing [Normal Sclera/Conjunctiva] : normal sclera/conjunctiva [PERRL] : pupils equal round and reactive to light [EOMI] : extraocular movements intact [Normal Outer Ear/Nose] : the outer ears and nose were normal in appearance [Normal Oropharynx] : the oropharynx was normal [No JVD] : no jugular venous distention [No Lymphadenopathy] : no lymphadenopathy [Supple] : supple [Thyroid Normal, No Nodules] : the thyroid was normal and there were no nodules present [No Respiratory Distress] : no respiratory distress  [No Accessory Muscle Use] : no accessory muscle use [Clear to Auscultation] : lungs were clear to auscultation bilaterally [Normal Rate] : normal rate  [Regular Rhythm] : with a regular rhythm [Normal S1, S2] : normal S1 and S2 [No Murmur] : no murmur heard [___ +] : bilateral [unfilled]U+ pitting edema to the ankles [No Carotid Bruits] : no carotid bruits [No Abdominal Bruit] : a ~M bruit was not heard ~T in the abdomen [No Varicosities] : no varicosities [Pedal Pulses Present] : the pedal pulses are present [No Edema] : there was no peripheral edema [No Palpable Aorta] : no palpable aorta [No Extremity Clubbing/Cyanosis] : no extremity clubbing/cyanosis [Soft] : abdomen soft [Non Tender] : non-tender [Non-distended] : non-distended [No Masses] : no abdominal mass palpated [No HSM] : no HSM [Normal Bowel Sounds] : normal bowel sounds [Normal Posterior Cervical Nodes] : no posterior cervical lymphadenopathy [Normal Anterior Cervical Nodes] : no anterior cervical lymphadenopathy [No CVA Tenderness] : no CVA  tenderness [No Spinal Tenderness] : no spinal tenderness [No Joint Swelling] : no joint swelling [Grossly Normal Strength/Tone] : grossly normal strength/tone [No Rash] : no rash [Coordination Grossly Intact] : coordination grossly intact [No Focal Deficits] : no focal deficits [Normal Gait] : normal gait [Deep Tendon Reflexes (DTR)] : deep tendon reflexes were 2+ and symmetric [Normal Affect] : the affect was normal [Normal Insight/Judgement] : insight and judgment were intact

## 2021-05-21 RX ORDER — APIXABAN 5 MG/1
5 TABLET, FILM COATED ORAL
Qty: 60 | Refills: 3 | Status: DISCONTINUED | COMMUNITY
Start: 2021-05-21 | End: 2021-05-21

## 2021-05-21 RX ORDER — RIVAROXABAN 20 MG/1
20 TABLET, FILM COATED ORAL
Qty: 90 | Refills: 2 | Status: DISCONTINUED | COMMUNITY
Start: 2021-04-27 | End: 2021-05-21

## 2021-05-24 ENCOUNTER — RX RENEWAL (OUTPATIENT)
Age: 56
End: 2021-05-24

## 2021-06-07 ENCOUNTER — RX RENEWAL (OUTPATIENT)
Age: 56
End: 2021-06-07

## 2021-06-07 ENCOUNTER — APPOINTMENT (OUTPATIENT)
Dept: INTERNAL MEDICINE | Facility: CLINIC | Age: 56
End: 2021-06-07
Payer: MEDICARE

## 2021-06-07 VITALS — SYSTOLIC BLOOD PRESSURE: 110 MMHG | HEART RATE: 70 BPM | DIASTOLIC BLOOD PRESSURE: 78 MMHG | RESPIRATION RATE: 12 BRPM

## 2021-06-07 VITALS — WEIGHT: 296.2 LBS | BODY MASS INDEX: 43.74 KG/M2

## 2021-06-07 DIAGNOSIS — R60.0 LOCALIZED EDEMA: ICD-10-CM

## 2021-06-07 PROCEDURE — 99214 OFFICE O/P EST MOD 30 MIN: CPT

## 2021-06-07 NOTE — ASSESSMENT
[FreeTextEntry1] : bilateral leg edema likely heat related already on a NOAC\par check doppler due in August\par for now lasix, daily banana, keeps legs elevated\par hld work on diet\par psych issues stable

## 2021-06-07 NOTE — HISTORY OF PRESENT ILLNESS
[FreeTextEntry8] : bilateral leg edema no pain\par weather has been hot\par noticed leg swelling\par has known dvt right leg\par no pain no fever no sob\par taking his xarelto and he does not like it

## 2021-06-08 ENCOUNTER — APPOINTMENT (OUTPATIENT)
Dept: PULMONOLOGY | Facility: CLINIC | Age: 56
End: 2021-06-08
Payer: MEDICARE

## 2021-06-08 VITALS
WEIGHT: 292 LBS | HEART RATE: 72 BPM | DIASTOLIC BLOOD PRESSURE: 66 MMHG | OXYGEN SATURATION: 98 % | BODY MASS INDEX: 43.12 KG/M2 | SYSTOLIC BLOOD PRESSURE: 110 MMHG

## 2021-06-08 DIAGNOSIS — J45.909 UNSPECIFIED ASTHMA, UNCOMPLICATED: ICD-10-CM

## 2021-06-08 DIAGNOSIS — R60.0 LOCALIZED EDEMA: ICD-10-CM

## 2021-06-08 PROCEDURE — 99214 OFFICE O/P EST MOD 30 MIN: CPT

## 2021-06-08 RX ORDER — LORAZEPAM 0.5 MG/1
0.5 TABLET ORAL
Refills: 0 | Status: DISCONTINUED | COMMUNITY
Start: 2019-10-15 | End: 2021-06-08

## 2021-06-08 RX ORDER — LATANOPROST/PF 0.005 %
0.01 DROPS OPHTHALMIC (EYE)
Refills: 0 | Status: ACTIVE | COMMUNITY

## 2021-06-08 RX ORDER — FLUTICASONE PROPIONATE 50 UG/1
50 SPRAY, METERED NASAL
Qty: 1 | Refills: 5 | Status: DISCONTINUED | COMMUNITY
Start: 2020-01-14 | End: 2021-06-08

## 2021-06-08 NOTE — REVIEW OF SYSTEMS
[Fatigue] : fatigue [Nasal Congestion] : nasal congestion [Abdominal Pain] : abdominal pain [Negative] : Endocrine [TextBox_134] : Bipolar/schizoaffective

## 2021-07-27 ENCOUNTER — APPOINTMENT (OUTPATIENT)
Dept: INTERNAL MEDICINE | Facility: CLINIC | Age: 56
End: 2021-07-27
Payer: MEDICARE

## 2021-07-27 VITALS — SYSTOLIC BLOOD PRESSURE: 100 MMHG | HEART RATE: 78 BPM | RESPIRATION RATE: 12 BRPM | DIASTOLIC BLOOD PRESSURE: 64 MMHG

## 2021-07-27 VITALS — BODY MASS INDEX: 45.24 KG/M2 | WEIGHT: 306.38 LBS

## 2021-07-27 DIAGNOSIS — I82.491 ACUTE EMBOLISM AND THROMBOSIS OF OTHER SPECIFIED DEEP VEIN OF RIGHT LOWER EXTREMITY: ICD-10-CM

## 2021-07-27 DIAGNOSIS — E78.5 HYPERLIPIDEMIA, UNSPECIFIED: ICD-10-CM

## 2021-07-27 DIAGNOSIS — M15.9 POLYOSTEOARTHRITIS, UNSPECIFIED: ICD-10-CM

## 2021-07-27 PROCEDURE — 99214 OFFICE O/P EST MOD 30 MIN: CPT

## 2021-07-27 NOTE — HISTORY OF PRESENT ILLNESS
[FreeTextEntry1] : follow up dvt [de-identified] : follow up dvt\par has been taking xarelto but wants to be off of it\par due for sono\par feels fine\par has schizo, has been compliant with meds\par labs are stable chol is good on meds

## 2021-08-04 DIAGNOSIS — F25.9 SCHIZOAFFECTIVE DISORDER, UNSPECIFIED: ICD-10-CM

## 2021-08-05 ENCOUNTER — APPOINTMENT (OUTPATIENT)
Dept: INTERNAL MEDICINE | Facility: CLINIC | Age: 56
End: 2021-08-05

## 2021-09-02 ENCOUNTER — RX RENEWAL (OUTPATIENT)
Age: 56
End: 2021-09-02

## 2021-11-29 ENCOUNTER — RX RENEWAL (OUTPATIENT)
Age: 56
End: 2021-11-29

## 2021-12-15 ENCOUNTER — APPOINTMENT (OUTPATIENT)
Dept: PULMONOLOGY | Facility: CLINIC | Age: 56
End: 2021-12-15
Payer: MEDICARE

## 2021-12-15 VITALS — SYSTOLIC BLOOD PRESSURE: 110 MMHG | DIASTOLIC BLOOD PRESSURE: 60 MMHG

## 2021-12-15 VITALS — BODY MASS INDEX: 45.04 KG/M2 | WEIGHT: 305 LBS | HEART RATE: 72 BPM | OXYGEN SATURATION: 98 %

## 2021-12-15 DIAGNOSIS — Z13.9 ENCOUNTER FOR SCREENING, UNSPECIFIED: ICD-10-CM

## 2021-12-15 PROCEDURE — 99213 OFFICE O/P EST LOW 20 MIN: CPT

## 2021-12-15 RX ORDER — RIVAROXABAN 20 MG/1
20 TABLET, FILM COATED ORAL
Qty: 30 | Refills: 7 | Status: DISCONTINUED | COMMUNITY
Start: 2021-05-21 | End: 2021-12-15

## 2021-12-15 NOTE — HISTORY OF PRESENT ILLNESS
[Former] : former [TextBox_4] : 56M PMH BIB on CPAP, asthma, DVT (05/2021) s/p 3 months of Xarelto, abdominal hernia, who presents for f/u. He is on Striverdi uses two puffs once per day. No SOB easily. No chest pain, no fevers, no chills. No recent hospitalizations. No fevers, no chills.  [TextBox_11] : 1 [TextBox_13] : 14 [YearQuit] : 1995

## 2021-12-15 NOTE — PROCEDURE
[FreeTextEntry1] : NISREEN\par \par EXAM: XR CHEST AP OR PA 1V \par PROCEDURE DATE: 05/01/2019 10:09PM \par . \par INTERPRETATION: \par PROCEDURE: XRY X-RAY EXAM CHEST 1 [id number] \par \par FINDINGS: INDICATION: Abdominal pain. COMPARISON: None available \par \par FINDINGS: \par \par Heart: The heart size is normal. \par \par Mediastinum: Mediastinal contours are normal. There are no enlarged \par mediastinal or hilar nodes. \par \par Lungs: The lungs are clear. \par \par Pulmonary vascularity: Pulmonary vascularity is normal. \par \par Osseous structures: The osseous structures are intact. \par \par Soft tissues:There are no soft tissue abnormalities \par \par Pleura: There are no pleural effusions. \par IMPRESSION: No sign of acute pulmonary disease. \par \par \par Distribution: \par  JAGDEEP CANGELOSI \par  JAGDEEP CANGELOSI \par \par Transcribed by: \par  PSCB \par on May 2 2019 10:05A \par \par Read by: \par MAYLIN FLOWERS \par  on \par May 2 2019 10:05A \par \par Signed by: \par Signed by: MAYLIN FLOWERS on May 2 2019 10:05A \par \par \par \par \par \par Signed by: MAYLIN FLOWERS M.D.,ATTENDING RADIOLOGIST \par \par ~~~~~~~~~~~~~~~~~~~~~~~~~~~~~~~~~~~~~~~~~~~~~~~~~~~~~~~~~~~~~~~~~~~~~~~~\par \par

## 2022-02-25 ENCOUNTER — RX RENEWAL (OUTPATIENT)
Age: 57
End: 2022-02-25

## 2022-04-06 ENCOUNTER — APPOINTMENT (OUTPATIENT)
Dept: PULMONOLOGY | Facility: CLINIC | Age: 57
End: 2022-04-06

## 2022-04-07 ENCOUNTER — INPATIENT (INPATIENT)
Facility: HOSPITAL | Age: 57
LOS: 7 days | Discharge: HOME | DRG: 885 | End: 2022-04-15
Attending: PSYCHIATRY & NEUROLOGY | Admitting: PSYCHIATRY & NEUROLOGY
Payer: MEDICARE

## 2022-04-07 VITALS
SYSTOLIC BLOOD PRESSURE: 156 MMHG | WEIGHT: 294.98 LBS | DIASTOLIC BLOOD PRESSURE: 79 MMHG | TEMPERATURE: 98 F | OXYGEN SATURATION: 100 % | HEIGHT: 70 IN | HEART RATE: 90 BPM | RESPIRATION RATE: 18 BRPM

## 2022-04-07 PROCEDURE — 99285 EMERGENCY DEPT VISIT HI MDM: CPT

## 2022-04-07 NOTE — ED ADULT TRIAGE NOTE - CHIEF COMPLAINT QUOTE
ambulatory to triage stating he tried to be checked into [facility], but was told he needed a referral from another hospital and came here. patient here for psychiatric evaluation regarding wanting to change psychiatric medications. reports he has been taking vraylar and invega, wants medication to be changed due to worsening insomnia. takes vraylar once daily and invega every night, has not taken invega dose yet tonight.   denies SI, states he thought of harming roomate? earlier today but did not. calm and cooperative at triage. psychiatrist: dr. pro jean [phone].

## 2022-04-08 DIAGNOSIS — F20.9 SCHIZOPHRENIA, UNSPECIFIED: ICD-10-CM

## 2022-04-08 DIAGNOSIS — F32.A DEPRESSION, UNSPECIFIED: ICD-10-CM

## 2022-04-08 DIAGNOSIS — F20.0 PARANOID SCHIZOPHRENIA: ICD-10-CM

## 2022-04-08 LAB
A1C WITH ESTIMATED AVERAGE GLUCOSE RESULT: 5.7 % — HIGH (ref 4–5.6)
ALBUMIN SERPL ELPH-MCNC: 4.4 G/DL — SIGNIFICANT CHANGE UP (ref 3.3–5)
ALP SERPL-CCNC: 33 U/L — LOW (ref 40–120)
ALT FLD-CCNC: 65 U/L — SIGNIFICANT CHANGE UP (ref 12–78)
ANION GAP SERPL CALC-SCNC: 5 MMOL/L — SIGNIFICANT CHANGE UP (ref 5–17)
APAP SERPL-MCNC: < 2 UG/ML (ref 10–30)
APPEARANCE UR: CLEAR — SIGNIFICANT CHANGE UP
AST SERPL-CCNC: 42 U/L — HIGH (ref 15–37)
BASOPHILS # BLD AUTO: 0.01 K/UL — SIGNIFICANT CHANGE UP (ref 0–0.2)
BASOPHILS NFR BLD AUTO: 0.1 % — SIGNIFICANT CHANGE UP (ref 0–2)
BILIRUB SERPL-MCNC: 0.3 MG/DL — SIGNIFICANT CHANGE UP (ref 0.2–1.2)
BILIRUB UR-MCNC: NEGATIVE — SIGNIFICANT CHANGE UP
BUN SERPL-MCNC: 26 MG/DL — HIGH (ref 7–23)
CALCIUM SERPL-MCNC: 10.2 MG/DL — HIGH (ref 8.5–10.1)
CHLORIDE SERPL-SCNC: 110 MMOL/L — HIGH (ref 96–108)
CO2 SERPL-SCNC: 27 MMOL/L — SIGNIFICANT CHANGE UP (ref 22–31)
COLOR SPEC: YELLOW — SIGNIFICANT CHANGE UP
CREAT SERPL-MCNC: 1.11 MG/DL — SIGNIFICANT CHANGE UP (ref 0.5–1.3)
DIFF PNL FLD: NEGATIVE — SIGNIFICANT CHANGE UP
EGFR: 76 ML/MIN/1.73M2 — SIGNIFICANT CHANGE UP
EOSINOPHIL # BLD AUTO: 0.01 K/UL — SIGNIFICANT CHANGE UP (ref 0–0.5)
EOSINOPHIL NFR BLD AUTO: 0.1 % — SIGNIFICANT CHANGE UP (ref 0–6)
ESTIMATED AVERAGE GLUCOSE: 117 MG/DL — HIGH (ref 68–114)
ETHANOL SERPL-MCNC: <10 MG/DL — SIGNIFICANT CHANGE UP (ref 0–10)
GLUCOSE SERPL-MCNC: 96 MG/DL — SIGNIFICANT CHANGE UP (ref 70–99)
GLUCOSE UR QL: NEGATIVE — SIGNIFICANT CHANGE UP
HCT VFR BLD CALC: 45.1 % — SIGNIFICANT CHANGE UP (ref 39–50)
HGB BLD-MCNC: 14.1 G/DL — SIGNIFICANT CHANGE UP (ref 13–17)
IMM GRANULOCYTES NFR BLD AUTO: 0.5 % — SIGNIFICANT CHANGE UP (ref 0–1.5)
KETONES UR-MCNC: NEGATIVE — SIGNIFICANT CHANGE UP
LEUKOCYTE ESTERASE UR-ACNC: NEGATIVE — SIGNIFICANT CHANGE UP
LYMPHOCYTES # BLD AUTO: 1.98 K/UL — SIGNIFICANT CHANGE UP (ref 1–3.3)
LYMPHOCYTES # BLD AUTO: 21 % — SIGNIFICANT CHANGE UP (ref 13–44)
MCHC RBC-ENTMCNC: 26.8 PG — LOW (ref 27–34)
MCHC RBC-ENTMCNC: 31.3 GM/DL — LOW (ref 32–36)
MCV RBC AUTO: 85.6 FL — SIGNIFICANT CHANGE UP (ref 80–100)
MONOCYTES # BLD AUTO: 0.77 K/UL — SIGNIFICANT CHANGE UP (ref 0–0.9)
MONOCYTES NFR BLD AUTO: 8.2 % — SIGNIFICANT CHANGE UP (ref 2–14)
NEUTROPHILS # BLD AUTO: 6.61 K/UL — SIGNIFICANT CHANGE UP (ref 1.8–7.4)
NEUTROPHILS NFR BLD AUTO: 70.1 % — SIGNIFICANT CHANGE UP (ref 43–77)
NITRITE UR-MCNC: NEGATIVE — SIGNIFICANT CHANGE UP
PCP SPEC-MCNC: SIGNIFICANT CHANGE UP
PH UR: 7 — SIGNIFICANT CHANGE UP (ref 5–8)
PLATELET # BLD AUTO: 268 K/UL — SIGNIFICANT CHANGE UP (ref 150–400)
POTASSIUM SERPL-MCNC: 3.8 MMOL/L — SIGNIFICANT CHANGE UP (ref 3.5–5.3)
POTASSIUM SERPL-SCNC: 3.8 MMOL/L — SIGNIFICANT CHANGE UP (ref 3.5–5.3)
PROT SERPL-MCNC: 7.5 GM/DL — SIGNIFICANT CHANGE UP (ref 6–8.3)
PROT UR-MCNC: NEGATIVE — SIGNIFICANT CHANGE UP
RBC # BLD: 5.27 M/UL — SIGNIFICANT CHANGE UP (ref 4.2–5.8)
RBC # FLD: 14 % — SIGNIFICANT CHANGE UP (ref 10.3–14.5)
SALICYLATES SERPL-MCNC: <1.7 MG/DL — LOW (ref 2.8–20)
SARS-COV-2 RNA SPEC QL NAA+PROBE: SIGNIFICANT CHANGE UP
SODIUM SERPL-SCNC: 142 MMOL/L — SIGNIFICANT CHANGE UP (ref 135–145)
SP GR SPEC: 1.01 — SIGNIFICANT CHANGE UP (ref 1.01–1.02)
TSH SERPL-MCNC: 0.61 UU/ML — SIGNIFICANT CHANGE UP (ref 0.34–4.82)
UROBILINOGEN FLD QL: NEGATIVE — SIGNIFICANT CHANGE UP
WBC # BLD: 9.43 K/UL — SIGNIFICANT CHANGE UP (ref 3.8–10.5)
WBC # FLD AUTO: 9.43 K/UL — SIGNIFICANT CHANGE UP (ref 3.8–10.5)

## 2022-04-08 PROCEDURE — 83036 HEMOGLOBIN GLYCOSYLATED A1C: CPT

## 2022-04-08 PROCEDURE — 36415 COLL VENOUS BLD VENIPUNCTURE: CPT

## 2022-04-08 PROCEDURE — 80061 LIPID PANEL: CPT

## 2022-04-08 PROCEDURE — 86803 HEPATITIS C AB TEST: CPT

## 2022-04-08 PROCEDURE — 93010 ELECTROCARDIOGRAM REPORT: CPT

## 2022-04-08 PROCEDURE — 0225U NFCT DS DNA&RNA 21 SARSCOV2: CPT

## 2022-04-08 PROCEDURE — 84443 ASSAY THYROID STIM HORMONE: CPT

## 2022-04-08 RX ORDER — HALOPERIDOL DECANOATE 100 MG/ML
5 INJECTION INTRAMUSCULAR ONCE
Refills: 0 | Status: DISCONTINUED | OUTPATIENT
Start: 2022-04-08 | End: 2022-04-15

## 2022-04-08 RX ORDER — ESCITALOPRAM OXALATE 10 MG/1
20 TABLET, FILM COATED ORAL DAILY
Refills: 0 | Status: DISCONTINUED | OUTPATIENT
Start: 2022-04-09 | End: 2022-04-09

## 2022-04-08 RX ORDER — LANOLIN ALCOHOL/MO/W.PET/CERES
6 CREAM (GRAM) TOPICAL AT BEDTIME
Refills: 0 | Status: COMPLETED | OUTPATIENT
Start: 2022-04-08 | End: 2022-04-08

## 2022-04-08 RX ORDER — FUROSEMIDE 40 MG
40 TABLET ORAL DAILY
Refills: 0 | Status: DISCONTINUED | OUTPATIENT
Start: 2022-04-08 | End: 2022-04-15

## 2022-04-08 RX ORDER — DIPHENHYDRAMINE HCL 50 MG
50 CAPSULE ORAL ONCE
Refills: 0 | Status: DISCONTINUED | OUTPATIENT
Start: 2022-04-08 | End: 2022-04-15

## 2022-04-08 RX ORDER — FENOFIBRATE,MICRONIZED 130 MG
145 CAPSULE ORAL DAILY
Refills: 0 | Status: DISCONTINUED | OUTPATIENT
Start: 2022-04-08 | End: 2022-04-15

## 2022-04-08 RX ORDER — ALPRAZOLAM 0.25 MG
0.5 TABLET ORAL ONCE
Refills: 0 | Status: DISCONTINUED | OUTPATIENT
Start: 2022-04-08 | End: 2022-04-08

## 2022-04-08 RX ADMIN — Medication 6 MILLIGRAM(S): at 21:28

## 2022-04-08 RX ADMIN — Medication 0.5 MILLIGRAM(S): at 10:34

## 2022-04-08 NOTE — ED ADULT NURSE NOTE - OBJECTIVE STATEMENT
57 year old male AAO X3, no acute distress noted. pt wants his psych medication to be changed. pt stated his meds are too expensive he cannot afford them. pt denies any SI/H// Drug use/ alcohol use/ hallucination. 57 year old male AAO X3, no acute distress noted. pt wants his psych medication to be changed. pt stated his meds are too expensive he cannot afford them. pt denies any SI/H// Drug use/ alcohol use/ hallucination. pt stated he is jobless, been going to [employer] a lot. lives with family. pt stated he would like to get admitted into a psych unit.

## 2022-04-08 NOTE — ED PROVIDER NOTE - OBJECTIVE STATEMENT
56 y/o M with h/o depression and schizophrenia p/w worsening depression and difficulty performing activities of daily living for the past couple of weeks.  Pt notes he was switched from Invega to oral paliperidone about a year ago and was started on Lexapro recently. He denies SI/HI.  He notes he has a gambling addiction and goes to the casino daily.  Pt has been unemployed.  He doesn't speak with anyone in his family and lives alone.  He attempted to check into [facility] today and was sent to be evaluated first.  He was admitted at [facility] in 2016.

## 2022-04-08 NOTE — ED BEHAVIORAL HEALTH ASSESSMENT NOTE - DETAILS
discussed with ED team discussed with ED attending paternal grandmother with psych admission per pt see hpi

## 2022-04-08 NOTE — ED PROVIDER NOTE - PROGRESS NOTE DETAILS
Pt to be admitted voluntarily.  Signed out to Dr. Truong.  Sriram Posada,  Attending Truong, received pt on sign out from Dr. Sriram Posada.  Pt feeling anxious plan xanax.  pt will be voluntary psych admit

## 2022-04-08 NOTE — ED ADULT NURSE NOTE - ORIENTED TO PERSON
The antibiotics for your UTI should cover your sinuses.    You have COVID-19, which is life-threatening. Even though you do not meet criteria for hospitalization at this point, and most people improve with supportive care, some patients do deteriorate and get sicker. Return for worsening symptoms, particularly SHORTNESS OF BREATH. Follow the additional guidelines provided for self-isolation/quarantine. Check your pulse oximeter as discussed; seek care if less than 92%.    Take 2 tablets of tylenol/acetaminophen every 4 hours as needed for pain. The maximum dosing of acetaminophen from all sources is 3000 mg in a 24-hour period.    For tips on disinfecting your home, check out the CDC website.  https://www.cdc.gov/coronavirus/2019-ncov/prepare/disinfecting-your-home.html    Per the Health Department:  Here's how you can prevent the spread of Coronavirus:  * Avoid close contact with people who are sick.  * Stay at home as much as possible. Cancel events and avoid groups, gatherings, play dates, and nonessential appointments.  * Stay home when you are sick, except to get medical care.  * Wash your hands regularly for at least 20 seconds. If soap and water are not available, use an alcohol-based hand  with at least 60% alcohol.  * Cover your mouth and nose with a tissue when you cough or sneeze or use the inside of your elbow.  * Stay at least six feet away from other people.  * Clean frequently touched surfaces and objects daily (e.g., tables, countertops, light switches, doorknobs, and cabinet handles).     Yes

## 2022-04-08 NOTE — ED BEHAVIORAL HEALTH ASSESSMENT NOTE - HPI (INCLUDE ILLNESS QUALITY, SEVERITY, DURATION, TIMING, CONTEXT, MODIFYING FACTORS, ASSOCIATED SIGNS AND SYMPTOMS)
Pt is a 56 y/o SWM, domiciled alone, on disability, reported past hx of depression and schizophrenia, PMH of glaucoma, asthma, HLD, reported prior psych admission in 2016 and in 1982, no prior suicidality or NSSIB, presenting self to ED for admission and medication adjustment.    Pt presents cooperative and calm during interview. His affect is flat, thoughts are tangential and mildly irritable, but directable. Pt informs that earlier today he tried to voluntarily admit himself to [facility] however was told he can only be transferred to that facility "its [facility]." With guidance he informs that for the past several months he feels that there are people breaking into his appartment all day/night and stealing his food/items. He has made management change his locks several times however this continues to happen. He reports that he follows with an outpatient psychiatrist Dr. Mariano "hes old and retired" and was recently started on lexapro 10mg as pt says he found himself crying frequently at his home and was experiencing feelings of hopelessness. Along with his lexapro he reports taking Vrylar 1.5mg and Invega 3mg daily. Previously he said he was taking Invega sustenna, but was changed to oral medication for unclear reasons, and patient believes medication is not effective and wants to change to zyprexa "because I can afford it". He feels safe in the hospital however does not want to return back to his apartment as he fears for his safety. He denies thoughts of wanting to harm self or others presently, but admits to feeling more depressed and hopeless over course of this past month including endorsing impaired sleep. He has limited social supports, with no family, few "friends", and says he has been estranged by prior supports due to gambling addictions. He denies substance misuse, denies any thoughts of wanting to harm others, and denies endorsing any AVH.  Pt requests voluntary hospitalization as he feels unsafe currently and wants to adjust his medication at present time.    No collaterals were provided by pt due to limited social supports, and unable to contact outpatient provider.

## 2022-04-08 NOTE — ED BEHAVIORAL HEALTH ASSESSMENT NOTE - SUMMARY
Pt is a 58 y/o SWM, domiciled alone, on disability, reported past hx of depression and schizophrenia, PMH of glaucoma, asthma, HLD, reported prior psych admission in 2016 and in 1982, no prior suicidality or NSSIB, presenting self to ED for admission and medication adjustment.  Pt with long-stand hx of schizophrenia who presented self to ED with paranoid and delusional thoughts which may be chronic but worsening due to recent changes in his medication, along with feelings of depression and hopelessness. Pt denies thoughts of wanting to harm self/others, however feels unsafe in current living environment fearing others are attempting to harm and steal from him in his apartment and seeks voluntary psychiatric admission and medication adjustments, which may be beneficial to him as he feels he is off of his psychiatric baseline at present.

## 2022-04-08 NOTE — ED ADULT NURSE REASSESSMENT NOTE - NS ED NURSE REASSESS COMMENT FT1
Report received from night RN Radames Kolb. Pt resting on stretcher in room. 1:1 in place at bedside. Pt calm and appropriately answering questions. Diet order requested from Dr Truong, 1:1 Emory to order breakfast for patient. Safety and comfort measures maintained. Will continue to monitor.

## 2022-04-08 NOTE — ED BEHAVIORAL HEALTH ASSESSMENT NOTE - PSYCHIATRIC ISSUES AND PLAN (INCLUDE STANDING AND PRN MEDICATION)
reconcile home medication and continue as prescribed for now. Pt may benefit from further optimization of current medication vs changing to depot injection which benefited him in the past. For acute agitation can give Haldol 5mg and ativan 2mg po or IM q6hrs as needed

## 2022-04-08 NOTE — ED BEHAVIORAL HEALTH ASSESSMENT NOTE - RISK ASSESSMENT
no prior hx of suicidal attempts, denies present s/h/i/i/p however endorsing mood sxs including some feelings of hopelessness and depression. Pt has prior psych hospitalizations however is motivated and compliant with his home medication and denies any illicit drug or substance misuse Low Acute Suicide Risk

## 2022-04-08 NOTE — ED BEHAVIORAL HEALTH NOTE - BEHAVIORAL HEALTH NOTE
===================      PRE-HOSPITAL COURSE      ===================      SOURCE:  Secondhand EMR documentation.       DETAILS:  Patient presents self to ED; chief complaint of wanting medication change, presented to [facility] last night.,     ============      ED COURSE:      ============      SOURCE:  RN and secondhand EMR documentation.       ARRIVAL:  Patient was cooperative with hospital protocol and allowed for gowning/wanding without incident. Patient presents with good hygiene/grooming. Patient placed on  1:1 In private room for consult.       BELONGINGS:  Bag with clothing, medication bottles, all secured.      BEHAVIOR: Blood/urine provided for routine labs without noted incident. Patient denies SI/HI/AH/VH however endorses wanting medications changed. Patient is AOx4 and does make eye contact; speech of normal volume/rate accompanied by a logical thought process. Patient has been resting while in hospital bed.      TREATMENT: Patient did not require medication intervention while in ED; has been in behavioral control.      VISITORS:  Patient presently unaccompanied by social supports while in ED.            COVID Exposure Screen- collateral (i.e. third-party, chart review, belongings, etc; include EMS and ED staff)     1. *Has the patient been tested for COVID-19 in the last 90 days? (X) Yes ( ) No (  ) Unknown- Reason: _____      IF YES PROCEED TO QUESTION #2. IF NO OR UNKNOWN, PLEASE SKIP TO QUESTION #3.      2. Date of test(s), type of test(s), result(s) for ALL tests in last 90 days: ___Negative_____     3. *Has the patient received a COVID-19 vaccine? ( X) Yes ( ) No ( ) Unknown- Reason: _____      IF YES PROCEED TO QUESTION #4. IF NO or UNKNOWN, PLEASE SKIP TO QUESTION #7.      4. Moderna ( ) Pfizer ( ) J&J ( )       5. Number of doses: ________      6. Date of last dose: ________      7. *In the past 10 days, has the patient been around anyone with a positive COVID-19 test?* ( ) Yes (X) No ( ) Unknown- Reason: ____      IF YES PLEASE ANSWER THE FOLLOWING QUESTIONS:      8. Was the patient within 6 feet of them for at least 15 minutes? ( ) Yes ( ) No ( ) Unknown- Reason: _____      9. Did the patient provide care for them? ( ) Yes ( ) No ( ) Unknown- Reason: ______      10. Did the patient have direct physical contact with them (touched, hugged, or kissed them)? ( ) Yes ( ) No ( ) Unknown- Reason: __      11. Did the patient share eating or drinking utensils with them? ( ) Yes ( ) No ( ) Unknown- Reason: ____      12. Did they sneeze, cough, or somehow get respiratory droplets on the patient? ( ) Yes ( ) No ( ) Unknown- Reason: ______

## 2022-04-08 NOTE — ED ADULT NURSE NOTE - CHIEF COMPLAINT QUOTE
ambulatory to triage stating he tried to be checked into [facility], but was told he needed a referral from another hospital and came here. patient here for psychiatric evaluation regarding wanting to change psychiatric medications. reports he has been taking vraylar and invega, wants medication to be changed due to worsening insomnia. takes vraylar once daily and invega every night, has not taken invega dose yet tonight.   denies SI, states he thought of harming roomate? earlier today but did not. calm and cooperative at triage. psychiatrist: dr. pro jean [phone].

## 2022-04-08 NOTE — PATIENT PROFILE BEHAVIORAL HEALTH - FALL HARM RISK - HARM RISK INTERVENTIONS

## 2022-04-08 NOTE — ED BEHAVIORAL HEALTH ASSESSMENT NOTE - OTHER PAST PSYCHIATRIC HISTORY (INCLUDE DETAILS REGARDING ONSET, COURSE OF ILLNESS, INPATIENT/OUTPATIENT TREATMENT)
sees a doctor "Apasandro" privately for medication management. Reports last psych hospitalization was at [facility] in 2016.  Not enrolled in outpatient psychotherapy

## 2022-04-09 LAB
CHOLEST SERPL-MCNC: 149 MG/DL — SIGNIFICANT CHANGE UP
HDLC SERPL-MCNC: 41 MG/DL — SIGNIFICANT CHANGE UP
LIPID PNL WITH DIRECT LDL SERPL: 92 MG/DL — SIGNIFICANT CHANGE UP
NON HDL CHOLESTEROL: 108 MG/DL — SIGNIFICANT CHANGE UP
TRIGL SERPL-MCNC: 82 MG/DL — SIGNIFICANT CHANGE UP

## 2022-04-09 PROCEDURE — 99221 1ST HOSP IP/OBS SF/LOW 40: CPT

## 2022-04-09 PROCEDURE — 99223 1ST HOSP IP/OBS HIGH 75: CPT

## 2022-04-09 RX ORDER — ALBUTEROL 90 UG/1
2 AEROSOL, METERED ORAL EVERY 6 HOURS
Refills: 0 | Status: DISCONTINUED | OUTPATIENT
Start: 2022-04-09 | End: 2022-04-15

## 2022-04-09 RX ORDER — PALIPERIDONE 1.5 MG/1
3 TABLET, EXTENDED RELEASE ORAL DAILY
Refills: 0 | Status: DISCONTINUED | OUTPATIENT
Start: 2022-04-10 | End: 2022-04-12

## 2022-04-09 RX ORDER — ESCITALOPRAM OXALATE 10 MG/1
10 TABLET, FILM COATED ORAL DAILY
Refills: 0 | Status: DISCONTINUED | OUTPATIENT
Start: 2022-04-09 | End: 2022-04-15

## 2022-04-09 RX ORDER — LANOLIN ALCOHOL/MO/W.PET/CERES
6 CREAM (GRAM) TOPICAL AT BEDTIME
Refills: 0 | Status: DISCONTINUED | OUTPATIENT
Start: 2022-04-09 | End: 2022-04-13

## 2022-04-09 RX ADMIN — Medication 40 MILLIGRAM(S): at 09:12

## 2022-04-09 RX ADMIN — Medication 6 MILLIGRAM(S): at 21:13

## 2022-04-09 RX ADMIN — ESCITALOPRAM OXALATE 20 MILLIGRAM(S): 10 TABLET, FILM COATED ORAL at 09:12

## 2022-04-09 RX ADMIN — Medication 145 MILLIGRAM(S): at 09:12

## 2022-04-09 NOTE — PROGRESS NOTE BEHAVIORAL HEALTH - NSBHFUPIPCHARTREVFT_PSY_A_CORE
57 yr old  male with hx of  He reports he has been taking vraylar and invega, wants medication to be changed due to worsening insomnia, but later claiming that he cant afford the invega . takes vraylar once daily and invega every night. He also  feels that there are people breaking into his apartment all day/night and stealing his food and other belonging . He has made management change his locks several times and he claims that the stealing still occurs.  He added that he is currently feeling better in the hospital 57 yr old  male with hx of schzoaffective disorder He reports he has been taking vraylar and invega, wants medication to be changed due to worsening insomnia, but later claiming that he cant afford the invega . takes vraylar once daily and invega every night. He also  feels that there are people breaking into his apartment all day/night and stealing his food and other belonging . He has made management change his locks several times and he claims that the stealing still occurs.  He added that he is currently feeling better in the hospital

## 2022-04-09 NOTE — PROGRESS NOTE BEHAVIORAL HEALTH - NSBHFUPINTERVALHXFT_PSY_A_CORE
on call covering note " Pt claiming  that he is "not able to continue affording the invega  but the medication helps me"   Pt now wanting the invega to be replaced with "maybe with zyprexa " but later indicated that the "zyprexa did not help me with the voices" . Pt then indicated that no contact was to be made with the provider or family.   Unclear as to continued treatment plan for the pt , will continue with the invega 3mg daily  at this time.  He is still with paranoid delusions .  He denies any suicidal ideation intent or plan

## 2022-04-09 NOTE — H&P ADULT - NSHPPHYSICALEXAM_GEN_ALL_CORE
Vital Signs Last 24 Hrs  T(C): 36.7 (09 Apr 2022 07:31), Max: 36.7 (09 Apr 2022 07:31)  T(F): 98 (09 Apr 2022 07:31), Max: 98 (09 Apr 2022 07:31)  HR: --  BP: --  BP(mean): --  RR: 18 (09 Apr 2022 07:31) (18 - 18)  SpO2: 100% (09 Apr 2022 07:31) (100% - 100%)    HEENT:   pupils equal and reactive, EOMI, no oropharyngeal lesions, erythema, exudates, oral thrush    NECK:   supple, no carotid bruits, no palpable lymph nodes, no thyromegaly    CV:  +S1, +S2, regular, no murmurs or rubs    RESP:   lungs clear to auscultation bilaterally, no wheezing, rales, rhonchi, good air entry bilaterally    BREAST:  not examined    GI:  abdomen soft, non-tender, non-distended, normal BS, no bruits, no abdominal masses, no palpable masses    RECTAL:  not examined    :  not examined    MSK:   normal muscle tone, no atrophy, no rigidity, no contractions    EXT:   no clubbing, no cyanosis, no edema, no calf pain, swelling or erythema    VASCULAR:  pulses equal and symmetric in the upper and lower extremities    NEURO:  AAOX3, no focal neurological deficits, follows all commands, able to move extremities spontaneously    SKIN:  no ulcers, lesions or rashes

## 2022-04-09 NOTE — H&P ADULT - HISTORY OF PRESENT ILLNESS
58 y/o SWM, domiciled alone, on disability, reported past hx of depression and schizophrenia, PMH of glaucoma, asthma, HLD, reported prior psych admission in 2016 and in 1982, no prior suicidality or NSSIB, presenting self to ED for admission for delusions . Patient states people are breaking into his apartment day and night , stealing his food, and he has changed his lock several times but it keeps happening. Patient with flat effect. Feels he needs help and his medication need to be adjusted. Denies any chest pain, sob, n/v/d.  56 y/o SWM, domiciled alone, on disability, reported past hx of depression and schizophrenia, PMH of glaucoma, asthma, Obesity, BIB (cpap at home) ,  HLD, reported prior psych admission in 2016 and in 1982, no prior suicidality or NSSIB, presenting self to ED for admission for delusions . Patient states people are breaking into his apartment day and night , stealing his food, and he has changed his lock several times but it keeps happening. Patient with flat effect. Feels he needs help and his medication need to be adjusted. Denies any chest pain, sob, n/v/d.

## 2022-04-09 NOTE — H&P ADULT - NSHPLABSRESULTS_GEN_ALL_CORE
Urinalysis Basic - ( 2022 01:13 )    Color: Yellow / Appearance: Clear / S.010 / pH: x  Gluc: x / Ketone: Negative  / Bili: Negative / Urobili: Negative   Blood: x / Protein: Negative / Nitrite: Negative   Leuk Esterase: Negative / RBC: x / WBC x   Sq Epi: x / Non Sq Epi: x / Bacteria: x    2022 01:13    142    |  110    |  26     ----------------------------<  96     3.8     |  27     |  1.11     Ca    10.2       2022 01:13    TPro  7.5    /  Alb  4.4    /  TBili  0.3    /  DBili  x      /  AST  42     /  ALT  65     /  AlkPhos  33     2022 01:13  LIVER FUNCTIONS - ( 2022 01:13 )  Alb: 4.4 g/dL / Pro: 7.5 gm/dL / ALK PHOS: 33 U/L / ALT: 65 U/L / AST: 42 U/L / GGT: x         CBC Full  -  ( 2022 01:13 )  WBC Count : 9.43 K/uL  Hemoglobin : 14.1 g/dL  Hematocrit : 45.1 %  Platelet Count - Automated : 268 K/uL  Mean Cell Volume : 85.6 fl  Mean Cell Hemoglobin : 26.8 pg  Mean Cell Hemoglobin Concentration : 31.3 gm/dL  Auto Neutrophil # : 6.61 K/uL  Auto Lymphocyte # : 1.98 K/uL  Auto Monocyte # : 0.77 K/uL  Auto Eosinophil # : 0.01 K/uL  Auto Basophil # : 0.01 K/uL  Auto Neutrophil % : 70.1 %  Auto Lymphocyte % : 21.0 %  Auto Monocyte % : 8.2 %  Auto Eosinophil % : 0.1 %  Auto Basophil % : 0.1 %

## 2022-04-09 NOTE — H&P ADULT - NSHPSOCIALHISTORY_GEN_ALL_CORE
patient lives with roomate, denies etoh abuse, illicit drug use      MEDICATION:    Current Medication:  · Current Medication	vraylar 1.5mg daily  invega 3mg daily  lexapro 10mg daily     Psychotropic Medication:  · Past Psychotropic Medication	clozapine     Prior Medication Side Effects or Adverse Reactions:  · Prior Medication Side Effects or Adverse Reactions	None known    PAST MEDICAL HISTORY:    Past Medical History:  · Description	glaucoma, asthma, hld    REVIEW OF ED CHART:    Review of ED Chart for current visit:  · Vital signs reviewed	Yes  · Available labs reviewed	Yes  · Available investigations reviewed (EKG, imaging, etc.)	Yes    FAMILY HISTORY:    Family History of Psychiatric Illness/Suicidality/Medical Illness/Substance Use:  · Family History (Psychiatric illness/suicidality/medical illness/substance use)	Yes  · Details	paternal grandmother with psych admission per pt    SOCIAL HISTORY:    Social History:  · Description	disabled, domiciled alone  · Legal History	denies  · [place]	No  · Abuse / Trauma History	No  · Adult or Child Protective Services Involvement	No

## 2022-04-09 NOTE — H&P ADULT - ASSESSMENT
56 y/o SWM, domiciled alone, on disability, reported past hx of depression and schizophrenia, PMH of glaucoma, asthma, HLD, reported prior psych admission in 2016 and in 1982, no prior suicidality or NSSIB, presenting self to ED for admission for delusions . Patient states people are breaking into his apartment day and night , stealing his food, and he has changed his lock several times but it keeps happening. Patient with flat effect. Feels he needs help and his medication need to be adjusted. Denies any chest pain, sob, n/v/d.     1-undertreated / untreated Schizoprenia  -care as per pyschiatry      2-Asthma- start albuterol inhaler PRN    3-HLD  -outpatient follow up    4-DVT prophy- scds      Medicine will sign off case, there are no acitive issues at this time, please call medicine as needed if change in medical status.  58 y/o SWM, domiciled alone, on disability, reported past hx of depression and schizophrenia, PMH of glaucoma, asthma, Obesity, BIB (cpap at home) ,  HLD, Hx of DVT (previiously onXarelto) , reported prior psych admission in 2016 and in 1982, no prior suicidality or NSSIB, presenting self to ED for admission for delusions . Patient states people are breaking into his apartment day and night , stealing his food, and he has changed his lock several times but it keeps happening. Patient with flat effect. Feels he needs help and his medication need to be adjusted. Denies any chest pain, sob, n/v/d.     1-undertreated / untreated Schizoprenia  -care as per pyschiatry      2-Asthma- start albuterol inhaler PRN    3-BIB  -patient uses CPAP at home. should be okay without use for few days however if extended stay in psychaitry , will need cpap.     4-HLD  -outpatient follow up    5-hx of leg edema  -c/w lasix      5-DVT prophy- scds

## 2022-04-10 LAB
HCV AB S/CO SERPL IA: 0.08 S/CO — SIGNIFICANT CHANGE UP (ref 0–0.99)
HCV AB SERPL-IMP: SIGNIFICANT CHANGE UP

## 2022-04-10 PROCEDURE — 99232 SBSQ HOSP IP/OBS MODERATE 35: CPT

## 2022-04-10 RX ADMIN — Medication 145 MILLIGRAM(S): at 09:14

## 2022-04-10 RX ADMIN — PALIPERIDONE 3 MILLIGRAM(S): 1.5 TABLET, EXTENDED RELEASE ORAL at 09:13

## 2022-04-10 RX ADMIN — Medication 40 MILLIGRAM(S): at 09:13

## 2022-04-10 RX ADMIN — Medication 6 MILLIGRAM(S): at 20:53

## 2022-04-10 RX ADMIN — ESCITALOPRAM OXALATE 10 MILLIGRAM(S): 10 TABLET, FILM COATED ORAL at 09:13

## 2022-04-10 NOTE — PROGRESS NOTE BEHAVIORAL HEALTH - NSBHCHARTREVIEWLAB_PSY_A_CORE FT
14.1   9.43  )-----------( 268      ( 08 Apr 2022 01:13 )             45.1     CBC Full  -  ( 08 Apr 2022 01:13 )  WBC Count : 9.43 K/uL  RBC Count : 5.27 M/uL  Hemoglobin : 14.1 g/dL  Hematocrit : 45.1 %  Platelet Count - Automated : 268 K/uL  Mean Cell Volume : 85.6 fl  Mean Cell Hemoglobin : 26.8 pg  Mean Cell Hemoglobin Concentration : 31.3 gm/dL  Auto Neutrophil # : 6.61 K/uL  Auto Lymphocyte # : 1.98 K/uL  Auto Monocyte # : 0.77 K/uL  Auto Eosinophil # : 0.01 K/uL  Auto Basophil # : 0.01 K/uL  Auto Neutrophil % : 70.1 %  Auto Lymphocyte % : 21.0 %  Auto Monocyte % : 8.2 %  Auto Eosinophil % : 0.1 %  Auto Basophil % : 0.1 %
14.1   9.43  )-----------( 268      ( 08 Apr 2022 01:13 )             45.1     CBC Full  -  ( 08 Apr 2022 01:13 )  WBC Count : 9.43 K/uL  RBC Count : 5.27 M/uL  Hemoglobin : 14.1 g/dL  Hematocrit : 45.1 %  Platelet Count - Automated : 268 K/uL  Mean Cell Volume : 85.6 fl  Mean Cell Hemoglobin : 26.8 pg  Mean Cell Hemoglobin Concentration : 31.3 gm/dL  Auto Neutrophil # : 6.61 K/uL  Auto Lymphocyte # : 1.98 K/uL  Auto Monocyte # : 0.77 K/uL  Auto Eosinophil # : 0.01 K/uL  Auto Basophil # : 0.01 K/uL  Auto Neutrophil % : 70.1 %  Auto Lymphocyte % : 21.0 %  Auto Monocyte % : 8.2 %  Auto Eosinophil % : 0.1 %  Auto Basophil % : 0.1 %

## 2022-04-10 NOTE — PROGRESS NOTE BEHAVIORAL HEALTH - NSBHFUPIPCHARTREVFT_PSY_A_CORE
57 yr old  male with hx of  He reports he has been taking vraylar and invega, wants medication to be changed due to worsening insomnia, but later claiming that he cant afford the invega . takes vraylar once daily and invega every night. He also  feels that there are people breaking into his apartment all day/night and stealing his food and other belonging . He has made management change his locks several times and he claims that the stealing still occurs.  He added that he is currently feeling better in the hospital

## 2022-04-10 NOTE — PROGRESS NOTE BEHAVIORAL HEALTH - NSBHFUPINTERVALHXFT_PSY_A_CORE
Covering note: Pt still claiming the Invega is helpful for the hallucination but is still wanting to change to other neuroleptic  "cause I can't afford the invega."  Pt willing to discuss this with his assigned MD. Meanwhile pt remaining on invega 3mg   Pt with use of restasis and systane ultra drops both medication to pharmacy for verification then can be used by the pt for the glaucoma.

## 2022-04-11 LAB
RAPID RVP RESULT: SIGNIFICANT CHANGE UP
SARS-COV-2 RNA SPEC QL NAA+PROBE: SIGNIFICANT CHANGE UP

## 2022-04-11 PROCEDURE — 99232 SBSQ HOSP IP/OBS MODERATE 35: CPT

## 2022-04-11 RX ORDER — BIMATOPROST 0.3 MG/ML
1 SOLUTION/ DROPS OPHTHALMIC AT BEDTIME
Refills: 0 | Status: DISCONTINUED | OUTPATIENT
Start: 2022-04-11 | End: 2022-04-15

## 2022-04-11 RX ADMIN — Medication 145 MILLIGRAM(S): at 09:17

## 2022-04-11 RX ADMIN — Medication 40 MILLIGRAM(S): at 09:16

## 2022-04-11 RX ADMIN — Medication 6 MILLIGRAM(S): at 20:30

## 2022-04-11 RX ADMIN — ESCITALOPRAM OXALATE 10 MILLIGRAM(S): 10 TABLET, FILM COATED ORAL at 09:16

## 2022-04-11 RX ADMIN — PALIPERIDONE 3 MILLIGRAM(S): 1.5 TABLET, EXTENDED RELEASE ORAL at 09:16

## 2022-04-11 RX ADMIN — BIMATOPROST 1 DROP(S): 0.3 SOLUTION/ DROPS OPHTHALMIC at 20:33
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
## 2022-04-12 PROCEDURE — 99232 SBSQ HOSP IP/OBS MODERATE 35: CPT

## 2022-04-12 RX ORDER — PALIPERIDONE 1.5 MG/1
6 TABLET, EXTENDED RELEASE ORAL DAILY
Refills: 0 | Status: DISCONTINUED | OUTPATIENT
Start: 2022-04-13 | End: 2022-04-15

## 2022-04-12 RX ADMIN — ESCITALOPRAM OXALATE 10 MILLIGRAM(S): 10 TABLET, FILM COATED ORAL at 09:05

## 2022-04-12 RX ADMIN — Medication 1 DROP(S): at 13:33

## 2022-04-12 RX ADMIN — PALIPERIDONE 3 MILLIGRAM(S): 1.5 TABLET, EXTENDED RELEASE ORAL at 09:05

## 2022-04-12 RX ADMIN — Medication 40 MILLIGRAM(S): at 09:05

## 2022-04-12 RX ADMIN — Medication 6 MILLIGRAM(S): at 20:14

## 2022-04-12 RX ADMIN — BIMATOPROST 1 DROP(S): 0.3 SOLUTION/ DROPS OPHTHALMIC at 20:15

## 2022-04-12 RX ADMIN — Medication 1 DROP(S): at 20:14

## 2022-04-12 RX ADMIN — Medication 145 MILLIGRAM(S): at 09:05
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
## 2022-04-13 PROCEDURE — 99232 SBSQ HOSP IP/OBS MODERATE 35: CPT

## 2022-04-13 RX ORDER — LANOLIN ALCOHOL/MO/W.PET/CERES
10 CREAM (GRAM) TOPICAL AT BEDTIME
Refills: 0 | Status: DISCONTINUED | OUTPATIENT
Start: 2022-04-13 | End: 2022-04-15

## 2022-04-13 RX ADMIN — Medication 1 DROP(S): at 12:46

## 2022-04-13 RX ADMIN — PALIPERIDONE 6 MILLIGRAM(S): 1.5 TABLET, EXTENDED RELEASE ORAL at 10:48

## 2022-04-13 RX ADMIN — Medication 145 MILLIGRAM(S): at 08:58

## 2022-04-13 RX ADMIN — Medication 40 MILLIGRAM(S): at 09:02

## 2022-04-13 RX ADMIN — BIMATOPROST 1 DROP(S): 0.3 SOLUTION/ DROPS OPHTHALMIC at 20:58

## 2022-04-13 RX ADMIN — Medication 10 MILLIGRAM(S): at 20:57

## 2022-04-13 RX ADMIN — Medication 1 DROP(S): at 08:56

## 2022-04-13 RX ADMIN — ESCITALOPRAM OXALATE 10 MILLIGRAM(S): 10 TABLET, FILM COATED ORAL at 09:02
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
## 2022-04-14 PROCEDURE — 99231 SBSQ HOSP IP/OBS SF/LOW 25: CPT

## 2022-04-14 RX ADMIN — ESCITALOPRAM OXALATE 10 MILLIGRAM(S): 10 TABLET, FILM COATED ORAL at 09:11

## 2022-04-14 RX ADMIN — Medication 1 DROP(S): at 20:31

## 2022-04-14 RX ADMIN — PALIPERIDONE 6 MILLIGRAM(S): 1.5 TABLET, EXTENDED RELEASE ORAL at 09:10

## 2022-04-14 RX ADMIN — Medication 10 MILLIGRAM(S): at 20:31

## 2022-04-14 RX ADMIN — Medication 40 MILLIGRAM(S): at 09:10

## 2022-04-14 RX ADMIN — Medication 145 MILLIGRAM(S): at 09:10

## 2022-04-14 RX ADMIN — Medication 1 DROP(S): at 09:56

## 2022-04-14 RX ADMIN — BIMATOPROST 1 DROP(S): 0.3 SOLUTION/ DROPS OPHTHALMIC at 20:31

## 2022-04-14 RX ADMIN — Medication 1 DROP(S): at 12:08
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
## 2022-04-15 VITALS — RESPIRATION RATE: 16 BRPM | OXYGEN SATURATION: 100 % | TEMPERATURE: 98 F

## 2022-04-15 LAB
RAPID RVP RESULT: DETECTED
SARS-COV-2 RNA SPEC QL NAA+PROBE: DETECTED

## 2022-04-15 PROCEDURE — 99239 HOSP IP/OBS DSCHRG MGMT >30: CPT

## 2022-04-15 RX ORDER — FENOFIBRATE,MICRONIZED 130 MG
1 CAPSULE ORAL
Qty: 30 | Refills: 0
Start: 2022-04-15 | End: 2022-05-14

## 2022-04-15 RX ORDER — PALIPERIDONE 1.5 MG/1
1 TABLET, EXTENDED RELEASE ORAL
Qty: 30 | Refills: 0
Start: 2022-04-15 | End: 2022-05-14

## 2022-04-15 RX ORDER — FUROSEMIDE 40 MG
1 TABLET ORAL
Qty: 30 | Refills: 0
Start: 2022-04-15 | End: 2022-05-14

## 2022-04-15 RX ORDER — BIMATOPROST 0.3 MG/ML
1 SOLUTION/ DROPS OPHTHALMIC
Qty: 1 | Refills: 0
Start: 2022-04-15 | End: 2022-05-14

## 2022-04-15 RX ORDER — LANOLIN ALCOHOL/MO/W.PET/CERES
1 CREAM (GRAM) TOPICAL
Qty: 30 | Refills: 0
Start: 2022-04-15 | End: 2022-05-14

## 2022-04-15 RX ORDER — ALBUTEROL 90 UG/1
2 AEROSOL, METERED ORAL
Qty: 1 | Refills: 0
Start: 2022-04-15 | End: 2022-05-14

## 2022-04-15 RX ORDER — ESCITALOPRAM OXALATE 10 MG/1
1 TABLET, FILM COATED ORAL
Qty: 30 | Refills: 0
Start: 2022-04-15 | End: 2022-05-14

## 2022-04-15 RX ADMIN — Medication 40 MILLIGRAM(S): at 09:07

## 2022-04-15 RX ADMIN — PALIPERIDONE 6 MILLIGRAM(S): 1.5 TABLET, EXTENDED RELEASE ORAL at 09:07

## 2022-04-15 RX ADMIN — Medication 1 DROP(S): at 09:07

## 2022-04-15 RX ADMIN — Medication 145 MILLIGRAM(S): at 09:07

## 2022-04-15 RX ADMIN — ESCITALOPRAM OXALATE 10 MILLIGRAM(S): 10 TABLET, FILM COATED ORAL at 09:07

## 2022-04-15 NOTE — DISCHARGE NOTE BEHAVIORAL HEALTH - NSBHDCPURPOSE1FT_PSY_A_CORE
Outpatient treatment, medication management. You have a telephone appointment with Dr. Galvan on 4/18/22 9:45 AM. Please call Dr. Galvan at the time of your scheduled appointment.

## 2022-04-15 NOTE — PROGRESS NOTE BEHAVIORAL HEALTH - NSBHADMITDANGERSELF_PSY_A_CORE
unable to care for self
suicidal ideation with plan and means
suicidal ideation with plan and means
unable to care for self
suicidal ideation with plan and means
suicidal ideation with plan and means
unable to care for self
suicidal ideation with plan and means

## 2022-04-15 NOTE — DISCHARGE NOTE BEHAVIORAL HEALTH - NSBHDCCRISISPLAN1FT_PSY_A_CORE
Tell a trusted friend/family member, tell housing staff, tell your outpatient provider, call a crisis line ( [facility] ph. [phone], [facility] DASH [phone], [facility] (peer support) ph. [phone]), return to the nearest emergency room. You may call 9-1-1 for assistance.

## 2022-04-15 NOTE — DISCHARGE NOTE BEHAVIORAL HEALTH - NSBHDCMEDSFT_PSY_A_CORE
Paliperidone 5 mg daily Paliperidone 5 mg daily    Pt educated not to stop taking medications unless told to do so by his doctor.

## 2022-04-15 NOTE — PROGRESS NOTE BEHAVIORAL HEALTH - SUMMARY
· Summary (brief):	Pt is a 56 y/o SWM, domiciled alone, on disability, reported past hx of depression and schizophrenia, PMH of glaucoma, asthma, HLD, reported prior psych admission in 2016 and in 1982, no prior suicidality or NSSIB, presenting self to ED for admission and medication adjustment.  Pt with long-stand hx of schizophrenia who presented self to ED with paranoid and delusional thoughts which may be chronic but worsening due to recent changes in his medication, along with feelings of depression and hopelessness. Pt denies thoughts of wanting to harm self/others, however feels unsafe in current living environment fearing others are attempting to harm and steal from him in his apartment and seeks voluntary psychiatric admission and medication adjustments, which may be beneficial to him as he feels he is off of his psychiatric baseline at present.    Plan: To continue paliperidone 3 mg daily now.          To continue eye meds as ordered with other meds as ordered for stability/safety
Pt is a 58 y/o SWM, domiciled alone, on disability, reported past hx of depression and schizophrenia, PMH of glaucoma, asthma, HLD, reported prior psych admission in 2016 and in 1982, no prior suicidality or NSSIB, presenting self to ED for admission and medication adjustment.  Pt with long-stand hx of schizophrenia who presented self to ED with paranoid and delusional thoughts which may be chronic but worsening due to recent changes in his medication, along with feelings of depression and hopelessness. Pt denies thoughts of wanting to harm self/others, however feels unsafe in current living environment fearing others are attempting to harm and steal from him in his apartment and seeks voluntary psychiatric admission and medication adjustments, which may be beneficial to him as he feels he is off of his psychiatric baseline at present.
Pt is a 58 y/o SWM, domiciled alone, on disability, reported past hx of depression and schizophrenia, PMH of glaucoma, asthma, HLD, reported prior psych admission in 2016 and in 1982, no prior suicidality or NSSIB, presenting self to ED for admission and medication adjustment.  Pt with long-stand hx of schizophrenia who presented self to ED with paranoid and delusional thoughts which may be chronic but worsening due to recent changes in his medication, along with feelings of depression and hopelessness. Pt denies thoughts of wanting to harm self/others, however feels unsafe in current living environment fearing others are attempting to harm and steal from him in his apartment and seeks voluntary psychiatric admission and medication adjustments, which may be beneficial to him as he feels he is off of his psychiatric baseline at present.
· Summary (brief):	Pt is a 56 y/o SWM, domiciled alone, on disability, reported past hx of depression and schizophrenia, PMH of glaucoma, asthma, HLD, reported prior psych admission in 2016 and in 1982, no prior suicidality or NSSIB, presenting self to ED for admission and medication adjustment.  Pt with long-stand hx of schizophrenia who presented self to ED with paranoid and delusional thoughts which may be chronic but worsening due to recent changes in his medication, along with feelings of depression and hopelessness. Pt denies thoughts of wanting to harm self/others, however feels unsafe in current living environment fearing others are attempting to harm and steal from him in his apartment and seeks voluntary psychiatric admission and medication adjustments, which may be beneficial to him as he feels he is off of his psychiatric baseline at present.    Plan: To continue paliperidone 3 mg daily now.          To continue eye meds as ordered with other meds as ordered for stability/safety
Pt is a 58 y/o SWM, domiciled alone, on disability, reported past hx of depression and schizophrenia, PMH of glaucoma, asthma, HLD, reported prior psych admission in 2016 and in 1982, no prior suicidality or NSSIB, presenting self to ED for admission and medication adjustment.  Pt with long-stand hx of schizophrenia who presented self to ED with paranoid and delusional thoughts which may be chronic but worsening due to recent changes in his medication, along with feelings of depression and hopelessness. Pt denies thoughts of wanting to harm self/others, however feels unsafe in current living environment fearing others are attempting to harm and steal from him in his apartment and seeks voluntary psychiatric admission and medication adjustments, which may be beneficial to him as he feels he is off of his psychiatric baseline at present.
· Summary (brief):	Pt is a 58 y/o SWM, domiciled alone, on disability, reported past hx of depression and schizophrenia, PMH of glaucoma, asthma, HLD, reported prior psych admission in 2016 and in 1982, no prior suicidality or NSSIB, presenting self to ED for admission and medication adjustment.  Pt with long-stand hx of schizophrenia who presented self to ED with paranoid and delusional thoughts which may be chronic but worsening due to recent changes in his medication, along with feelings of depression and hopelessness. Pt denies thoughts of wanting to harm self/others, however feels unsafe in current living environment fearing others are attempting to harm and steal from him in his apartment and seeks voluntary psychiatric admission and medication adjustments, which may be beneficial to him as he feels he is off of his psychiatric baseline at present.    Plan: To continue paliperidone 3 mg daily now.          To continue eye meds as ordered with other meds as ordered for stability/safety
· Summary (brief):	Pt is a 56 y/o SWM, domiciled alone, on disability, reported past hx of depression and schizophrenia, PMH of glaucoma, asthma, HLD, reported prior psych admission in 2016 and in 1982, no prior suicidality or NSSIB, presenting self to ED for admission and medication adjustment.  Pt with long-stand hx of schizophrenia who presented self to ED with paranoid and delusional thoughts which may be chronic but worsening due to recent changes in his medication, along with feelings of depression and hopelessness. Pt denies thoughts of wanting to harm self/others, however feels unsafe in current living environment fearing others are attempting to harm and steal from him in his apartment and seeks voluntary psychiatric admission and medication adjustments, which may be beneficial to him as he feels he is off of his psychiatric baseline at present.    Plan: To continue paliperidone 3 mg daily now.          To continue eye meds as ordered with other meds as ordered for stability/safety
· Summary (brief):	Pt is a 58 y/o SWM, domiciled alone, on disability, reported past hx of depression and schizophrenia, PMH of glaucoma, asthma, HLD, reported prior psych admission in 2016 and in 1982, no prior suicidality or NSSIB, presenting self to ED for admission and medication adjustment.  Pt with long-stand hx of schizophrenia who presented self to ED with paranoid and delusional thoughts which may be chronic but worsening due to recent changes in his medication, along with feelings of depression and hopelessness. Pt denies thoughts of wanting to harm self/others, however feels unsafe in current living environment fearing others are attempting to harm and steal from him in his apartment and seeks voluntary psychiatric admission and medication adjustments, which may be beneficial to him as he feels he is off of his psychiatric baseline at present.    Plan: To continue paliperidone 3 mg daily now.          To continue eye meds as ordered with other meds as ordered for stability/safety

## 2022-04-15 NOTE — DISCHARGE NOTE BEHAVIORAL HEALTH - CONDITIONS AT DISCHARGE
Patient alert, oriented x3. Therapist and nurse reviewed safety plan with patient who denies any thoughts to self harm of others. SW and nurse reviewed discharge plan with patient who is in agreement with plan. Patient received a copy of his discharge instructions. All belongings returned to patient.

## 2022-04-15 NOTE — PROGRESS NOTE BEHAVIORAL HEALTH - THOUGHT PROCESS
Tangential/Perseverative/Illogical/Impaired reasoning
Tangential/Perseverative
Tangential/Perseverative/Illogical/Impaired reasoning

## 2022-04-15 NOTE — PROGRESS NOTE BEHAVIORAL HEALTH - RISK ASSESSMENT
Moderate Risk-Not S/H and with last SA by PO/OD more than 3 years ago.  Protective Factors-Family Support  Mitigating Factors--In treatment Voluntarily
moderate risk   acute  paranoid delusion  mitigating  denies any ideation intent or plan   protective place to live   chronic prior admission and med trials was at [facility]
moderate risk   acute  paranoid delusion  mitigating  denies any ideation intent or plan   protective place to live   chronic prior admission and med trials was at [facility]
Moderate Risk-Not S/H and with last SA by PO/OD more than 3 years ago.  Protective Factors-Family Support  Mitigating Factors--In treatment Voluntarily
Moderate Risk-Not S/H and with last SA by PO/OD more than 3 years ago.  Protective Factors-Family Support  Mitigating Factors--In treatment Voluntarily
moderate risk   acute  paranoid delusion  mitigating  denies any ideation intent or plan   protective place to live   chronic prior admission and med trials was at [facility]
Moderate Risk-Not S/H and with last SA by PO/OD more than 3 years ago.  Protective Factors-Family Support  Mitigating Factors--In treatment Voluntarily
Moderate Risk-Not S/H and with last SA by PO/OD more than 3 years ago.

## 2022-04-15 NOTE — PROGRESS NOTE BEHAVIORAL HEALTH - NSBHADMITMEDEDUDETAILS_A_CORE FT
use of and potential side effect
Discussed risks, Benefits; s-e
Discussed risks, Benefits; s-e
use of and potential side effect
Discussed risks, Benefits; s-e
Discussed risks, Benefits; s-e
use of and potential side effect
Discussed risks, Benefits; s-e

## 2022-04-15 NOTE — DISCHARGE NOTE BEHAVIORAL HEALTH - NSBHDCCRISISPLAN2FT_PSY_A_CORE
Call [facility]N: [phone]  : Carol Martinez LMSW  Psychiatrists- Dr. Griffith

## 2022-04-15 NOTE — PROGRESS NOTE BEHAVIORAL HEALTH - NSBHPTASSESSDT_PSY_A_CORE
11-Apr-2022 12:00
10-Apr-2022 14:04
14-Apr-2022 11:00
12-Apr-2022 13:10
10-Apr-2022 10:50
15-Apr-2022 12:31
09-Apr-2022 21:33
13-Apr-2022 12:09

## 2022-04-15 NOTE — PROGRESS NOTE BEHAVIORAL HEALTH - NSBHCHARTREVIEWINVESTIGATE_PSY_A_CORE FT
Ventricular Rate 70 BPM    Atrial Rate 70 BPM    P-R Interval 196 ms    QRS Duration 106 ms    Q-T Interval 382 ms    QTC Calculation(Bazett) 412 ms    P Axis 25 degrees    R Axis 18 degrees    T Axis 32 degrees    Diagnosis Line Normal sinus rhythm with sinus arrhythmia  Normal ECG  When compared with ECG of 01-APR-2005 20:55,  No significant change was found  Confirmed by Palla MD, Diego ([id number]) on 4/8/2022 8:14:32 PM
Ventricular Rate 70 BPM    Atrial Rate 70 BPM    P-R Interval 196 ms    QRS Duration 106 ms    Q-T Interval 382 ms    QTC Calculation(Bazett) 412 ms    P Axis 25 degrees    R Axis 18 degrees    T Axis 32 degrees    Diagnosis Line Normal sinus rhythm with sinus arrhythmia  Normal ECG  When compared with ECG of 01-APR-2005 20:55,  No significant change was found  Confirmed by Palla MD, Diego ([id number]) on 4/8/2022 8:14:32 PM
entricular Rate 70 BPM    Atrial Rate 70 BPM    P-R Interval 196 ms    QRS Duration 106 ms    Q-T Interval 382 ms    QTC Calculation(Bazett) 412 ms    P Axis 25 degrees    R Axis 18 degrees    T Axis 32 degrees    Diagnosis Line Normal sinus rhythm with sinus arrhythmia  Normal ECG  When compared with ECG of 01-APR-2005 20:55,  No significant change was found  Confirmed by Palla MD, Diego ([id number]) on 4/8/2022 8:14:32 PM
Ventricular Rate 70 BPM    Atrial Rate 70 BPM    P-R Interval 196 ms    QRS Duration 106 ms    Q-T Interval 382 ms    QTC Calculation(Bazett) 412 ms    P Axis 25 degrees    R Axis 18 degrees    T Axis 32 degrees    Diagnosis Line Normal sinus rhythm with sinus arrhythmia  Normal ECG  When compared with ECG of 01-APR-2005 20:55,  No significant change was found  Confirmed by Palla MD, Diego ([id number]) on 4/8/2022 8:14:32 PM

## 2022-04-15 NOTE — DISCHARGE NOTE BEHAVIORAL HEALTH - NSBHDCRESOURCESOTHERFT_PSY_A_CORE
SHAINA DASH- for psychiatric crises (available AFTER HOURS)  24/7 hotline: [phone]  Address: [address] [place] [address] [place] [address]    Mood Disorder Support Group [facility]:   Free Zoom Support Groups:   [email or web address]/zoommeetings    0-928-885-WALI ([phone]) or info@wali.org

## 2022-04-15 NOTE — PROGRESS NOTE BEHAVIORAL HEALTH - NSBHROSSYSTEMS_PSY_ALL_CORE
Eyes.../Psychiatric/Endocrine...

## 2022-04-15 NOTE — DISCHARGE NOTE BEHAVIORAL HEALTH - NSBHDCTHERAPYFT_PSY_A_CORE
[facility] Activities  Psycho-Education Pt provided with individual and group therapies including safety planning and coping skills, along with psychoeducation related to diagnosis.

## 2022-04-15 NOTE — DISCHARGE NOTE BEHAVIORAL HEALTH - NS TRANSFER PATIENT BELONGINGS
Clothing 2  cell phones, 4 credit cards, 2 bank books, COVID card, 3 car controllers, car key, set of house keys, ID, 3 cell phone chargers, pack razors, power bank, open camera lens, nail clippers/Cell Phone/PDA (specify)/Other belongings/Clothing

## 2022-04-15 NOTE — DISCHARGE NOTE BEHAVIORAL HEALTH - PAST PSYCHIATRIC HISTORY
Hx of Schizophrenia, multiple past Psychiatric admissions, NO prior SI/SA or NSSIB noted. Denied drug abuse hx as well.

## 2022-04-15 NOTE — DISCHARGE NOTE BEHAVIORAL HEALTH - MEDICATION SUMMARY - MEDICATIONS TO TAKE
I will START or STAY ON the medications listed below when I get home from the hospital:    escitalopram 10 mg oral tablet  -- 1 tab(s) by mouth once a day x 30 days   -- Indication: For Mood    fenofibrate 145 mg oral tablet  -- 1 tab(s) by mouth once a day x 30 days   -- Indication: For HLD    paliperidone 6 mg oral tablet, extended release  -- 1 tab(s) by mouth once a day x 30 days   -- Indication: For Schizophrenia    albuterol 90 mcg/inh inhalation aerosol  -- 2 puff(s) inhaled every 6 hours x 30 days, As Needed -sob or wheezy   -- Indication: For COPD/Asthma    furosemide 40 mg oral tablet  -- 1 tab(s) by mouth once a day x 30 days   -- Indication: For HTN    melatonin 10 mg oral tablet  -- 1 tab(s) by mouth once a day (at bedtime) x 30 days   -- Indication: For Insomnia    ocular lubricant ophthalmic solution  -- 1 drop(s) to each affected eye 3 times a day x 30 days   -- Indication: For Dry Eyes    bimatoprost 0.03% ophthalmic solution  -- 1 drop(s) to each affected eye once a day (at bedtime) x 30 days   -- Indication: For Glaucoma

## 2022-04-15 NOTE — PROGRESS NOTE BEHAVIORAL HEALTH - THOUGHT CONTENT
Preoccupations
Delusions
Preoccupations
Preoccupations

## 2022-04-15 NOTE — DISCHARGE NOTE BEHAVIORAL HEALTH - NS AS DC FOLLOWUP STROKE INST
Render Note In Bullet Format When Appropriate: No Duration Of Freeze Thaw-Cycle (Seconds): 7 Number Of Freeze-Thaw Cycles: 2 freeze-thaw cycles Render Post-Care Instructions In Note?: yes Consent: The patient's consent was obtained including but not limited to risks of crusting, scabbing, blistering, scarring, darker or lighter pigmentary change, recurrence, incomplete removal and infection. Detail Level: Detailed Post-Care Instructions: I reviewed with the patient in detail post-care instructions. Patient is to wear sunprotection, and avoid picking at any of the treated lesions. Pt may apply Vaseline to crusted or scabbing areas. Coronavirus/COVID19 Coronavirus/COVID19/Influenza vaccination ([facility] Pub Date: August 6, 2021)
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
## 2022-04-15 NOTE — DISCHARGE NOTE BEHAVIORAL HEALTH - HPI (INCLUDE ILLNESS QUALITY, SEVERITY, DURATION, TIMING, CONTEXT, MODIFYING FACTORS, ASSOCIATED SIGNS AND SYMPTOMS)
Pt is a 58 y/o SWM, domiciled alone, on disability, reported past hx of depression and schizophrenia, PMH of glaucoma, asthma, HLD, reported prior psych admission in 2016 and in 1982, no prior suicidality or NSSIB, presenting self to ED for admission and medication adjustment.    Pt presents cooperative and calm during interview. His affect is flat, thoughts are tangential and mildly irritable, but directable. Pt informs that earlier today he tried to voluntarily admit himself to [facility] however was told he can only be transferred to that facility "its a [facility]." With guidance he informs that for the past several months he feels that there are people breaking into his apartment all day/night and stealing his food/items. He has made management change his locks several times however this continues to happen. He reports that he follows with an outpatient psychiatrist Dr. Mariano " he's old and retired" and was recently started on Lexapro 10mg as pt says he found himself crying frequently at his home and was experiencing feelings of hopelessness. Along with his Lexapro he reports taking Vraylar 1.5mg and Invega 3mg daily. Previously he said he was taking Invega Sustenna, but was changed to oral medication for unclear reasons, and patient believes medication is not effective and wants to change to Zyprexa "because I can afford it". He feels safe in the hospital however does not want to return back to his apartment as he fears for his safety. He denies thoughts of wanting to harm self or others presently, but admits to feeling more depressed and hopeless over course of this past month including endorsing impaired sleep. He has limited social supports, with no family, few "friends", and says he has been estranged by prior supports due to gambling addictions. He denies substance misuse, denies any thoughts of wanting to harm others, and denies endorsing any AVH.  Pt requests voluntary hospitalization as he feels unsafe currently and wants to adjust his medication at present time.    No collaterals were provided by pt due to limited social supports, and unable to contact outpatient provider.    Interval Hx:  Patient was seen today AM, chart reviewed and discussed in team. He endorses that he feels that the meds has to be changed. He was previously on Clozapine for many years from 5094-9095, was taken off as it causes Anal Fissures with Constipation and feels that he can't it anymore so was changed to other meds. He took Zyprexa for 2 weeks does not remember the benefits. He added that Latuda causes restlessness and was never on Geodon and was on Paliperidone 156 mg IM for a year was not sure why it was taken off and now on oral Paliperidone 3 mg and wants to change as he has to pay co-pay of 4 dollars and once he is discharged from here he plans to work and feels that if the wage is more than 200 dollars he may loose his benefits so thus wants to change. He was offered that he may work and show in paper getting around 200 dollars, and the rest can be taken as cash and to continue with Paliperidone 3 mg as ordered for now. He is also paranoid and suspicious and about people looking at him, getting to his apartment, so changed the locks. He also takes Lumigan/Refresh eye drops for Glaucoma/Dry eyes. 3 prior SA by OD , denied drug abuse hx.    He continued on oral Paliperidone, later Paliperidone was increased to 6 mg orally. He seems to do well and was not paranoid or suspicious here. He endorses that he may still be somewhat paranoid, but the intensity would be less. He was not S/h at the time of discharge and there were no instances of self harm in the unit, all the meds for eye and other systems were provided and there were no complaints till now. No drug abuse hx noted. He had good sleep/appetite during his stay here. All meds were sent to pharmacy as requested.

## 2022-04-15 NOTE — DISCHARGE NOTE BEHAVIORAL HEALTH - CARE PROVIDER_API CALL
Dashawn Rivera  See SW note below for continued after care f/u  Phone: (   )    -  Fax: (   )    -  Follow Up Time:

## 2022-04-15 NOTE — PROGRESS NOTE BEHAVIORAL HEALTH - NSBHADMITIPOBSFT_PSY_A_CORE
Not S/H now
denies any intent or plan
Not S/H now
denies any intent or plan
Not S/H now
Not S/H now
denies any intent or plan
Not S/H now

## 2022-04-15 NOTE — PROGRESS NOTE BEHAVIORAL HEALTH - NSBHADMITIPBHPROVFT_PSY_A_CORE
Spoke with Clinic from ED

## 2022-04-15 NOTE — PROGRESS NOTE BEHAVIORAL HEALTH - NSBHFUPINTERVALCCFT_PSY_A_CORE
"I really wanted to go to [facility] but they got me here "
" I don't feel safe "
"I would still want to have my medication changed "
" I don't feel safe "

## 2022-04-15 NOTE — DISCHARGE NOTE BEHAVIORAL HEALTH - NS MD DC FALL RISK RISK
For information on Fall & Injury Prevention, visit: [email or web address] OR  [email or web address] OR  https://www.cdc.gov/steadi/patient.html

## 2022-04-15 NOTE — PROGRESS NOTE BEHAVIORAL HEALTH - OTHER
observed sitting on [facility]
observed sitting on [facility]
observed sitting on [facility]
observed sitting on [facility]
observed sitting on [facility]
observed sitting on [facility]
observed sitting on [facility]
observed sitting on [facility]

## 2022-04-15 NOTE — PROGRESS NOTE BEHAVIORAL HEALTH - PRIMARY DX
Paranoid schizophrenia

## 2022-04-15 NOTE — PROGRESS NOTE BEHAVIORAL HEALTH - NSBHCHARTREVIEWVS_PSY_A_CORE FT
Vital Signs Last 24 Hrs  T(C): 36.4 (11 Apr 2022 08:18), Max: 36.4 (11 Apr 2022 08:18)  T(F): 97.5 (11 Apr 2022 08:18), Max: 97.5 (11 Apr 2022 08:18)  HR: --  BP: --  BP(mean): --  RR: 16 (11 Apr 2022 08:18) (16 - 16)  SpO2: 98% (11 Apr 2022 08:18) (98% - 98%)
Vital Signs Last 24 Hrs  T(C): 36.4 (12 Apr 2022 07:13), Max: 36.4 (12 Apr 2022 07:13)  T(F): 97.5 (12 Apr 2022 07:13), Max: 97.5 (12 Apr 2022 07:13)  HR: --  BP: --  BP(mean): --  RR: 16 (12 Apr 2022 07:13) (16 - 16)  SpO2: 98% (12 Apr 2022 07:13) (98% - 98%)
Vital Signs Last 24 Hrs  T(C): 36.4 (13 Apr 2022 07:33), Max: 36.6 (12 Apr 2022 16:19)  T(F): 97.6 (13 Apr 2022 07:33), Max: 97.8 (12 Apr 2022 16:19)  HR: 94 (12 Apr 2022 20:20) (94 - 94)  BP: 149/79 (12 Apr 2022 20:20) (149/79 - 149/79)  BP(mean): --  RR: 16 (13 Apr 2022 07:33) (16 - 18)  SpO2: 98% (13 Apr 2022 07:33) (96% - 98%)
Vital Signs Last 24 Hrs  T(C): 37.1 (14 Apr 2022 07:10), Max: 37.1 (14 Apr 2022 07:10)  T(F): 98.7 (14 Apr 2022 07:10), Max: 98.7 (14 Apr 2022 07:10)  HR: 85 (14 Apr 2022 00:11) (85 - 92)  BP: 106/68 (14 Apr 2022 00:11) (106/68 - 136/76)  BP(mean): --  RR: 16 (14 Apr 2022 07:10) (16 - 16)  SpO2: 100% (14 Apr 2022 07:10) (100% - 100%)
Vital Signs Last 24 Hrs  T(C): 36.7 (09 Apr 2022 07:31), Max: 36.7 (09 Apr 2022 07:31)  T(F): 98 (09 Apr 2022 07:31), Max: 98 (09 Apr 2022 07:31)  HR: --  BP: --  BP(mean): --  RR: 18 (09 Apr 2022 07:31) (18 - 18)  SpO2: 100% (09 Apr 2022 07:31) (100% - 100%)
Vital Signs Last 24 Hrs  T(C): 36.6 (10 Apr 2022 07:20), Max: 36.6 (10 Apr 2022 07:20)  T(F): 97.9 (10 Apr 2022 07:20), Max: 97.9 (10 Apr 2022 07:20)  HR: --  BP: --  BP(mean): --  RR: 16 (10 Apr 2022 07:20) (16 - 16)  SpO2: 100% (10 Apr 2022 07:20) (100% - 100%)
Vital Signs Last 24 Hrs  T(C): 36.9 (15 Apr 2022 07:19), Max: 36.9 (15 Apr 2022 07:19)  T(F): 98.4 (15 Apr 2022 07:19), Max: 98.4 (15 Apr 2022 07:19)  HR: --  BP: --  BP(mean): --  RR: 16 (15 Apr 2022 07:19) (16 - 16)  SpO2: 100% (15 Apr 2022 07:19) (100% - 100%)
Vital Signs Last 24 Hrs  T(C): 36.7 (09 Apr 2022 07:31), Max: 36.7 (09 Apr 2022 07:31)  T(F): 98 (09 Apr 2022 07:31), Max: 98 (09 Apr 2022 07:31)  HR: --  BP: --  BP(mean): --  RR: 18 (09 Apr 2022 07:31) (18 - 18)  SpO2: 100% (09 Apr 2022 07:31) (100% - 100%)

## 2022-04-15 NOTE — PROGRESS NOTE BEHAVIORAL HEALTH - AFFECT QUALITY
Anxious
Depressed/Anxious
Anxious
Depressed/Anxious
Depressed/Euthymic
Anxious

## 2022-04-15 NOTE — PROGRESS NOTE BEHAVIORAL HEALTH - AXIS III
HLD, glaucoma, asthma

## 2022-04-15 NOTE — DISCHARGE NOTE BEHAVIORAL HEALTH - PROVIDER TOKENS
FREE:[LAST:[Dr. Galvan],FIRST:[Dashawn],PHONE:[(   )    -],FAX:[(   )    -],ADDRESS:[See SW note below for continued after care f/u]]

## 2022-04-18 ENCOUNTER — NON-APPOINTMENT (OUTPATIENT)
Age: 57
End: 2022-04-18

## 2022-04-21 DIAGNOSIS — G47.33 OBSTRUCTIVE SLEEP APNEA (ADULT) (PEDIATRIC): ICD-10-CM

## 2022-04-21 DIAGNOSIS — J45.909 UNSPECIFIED ASTHMA, UNCOMPLICATED: ICD-10-CM

## 2022-04-21 DIAGNOSIS — R60.0 LOCALIZED EDEMA: ICD-10-CM

## 2022-04-21 DIAGNOSIS — H40.9 UNSPECIFIED GLAUCOMA: ICD-10-CM

## 2022-04-21 DIAGNOSIS — E66.9 OBESITY, UNSPECIFIED: ICD-10-CM

## 2022-04-21 DIAGNOSIS — F20.0 PARANOID SCHIZOPHRENIA: ICD-10-CM

## 2022-04-21 DIAGNOSIS — Z91.51 PERSONAL HISTORY OF SUICIDAL BEHAVIOR: ICD-10-CM

## 2022-04-21 DIAGNOSIS — F32.A DEPRESSION, UNSPECIFIED: ICD-10-CM

## 2022-04-21 DIAGNOSIS — Z59.6 LOW INCOME: ICD-10-CM

## 2022-04-21 SDOH — ECONOMIC STABILITY - INCOME SECURITY: LOW INCOME: Z59.6

## 2022-05-21 ENCOUNTER — EMERGENCY (EMERGENCY)
Facility: HOSPITAL | Age: 57
LOS: 1 days | Discharge: HOME | End: 2022-05-21
Admitting: EMERGENCY MEDICINE
Payer: MEDICARE

## 2022-05-21 DIAGNOSIS — R30.0 DYSURIA: ICD-10-CM

## 2022-05-21 DIAGNOSIS — Z87.891 PERSONAL HISTORY OF NICOTINE DEPENDENCE: ICD-10-CM

## 2022-05-21 DIAGNOSIS — I10 ESSENTIAL (PRIMARY) HYPERTENSION: ICD-10-CM

## 2022-05-21 DIAGNOSIS — L03.032 CELLULITIS OF LEFT TOE: ICD-10-CM

## 2022-05-21 PROBLEM — F20.9 SCHIZOPHRENIA, UNSPECIFIED: Chronic | Status: ACTIVE | Noted: 2022-04-08

## 2022-05-21 PROCEDURE — 99284 EMERGENCY DEPT VISIT MOD MDM: CPT | Mod: FS

## 2022-06-02 NOTE — ED PROVIDER NOTE - MDM ORDERS SUBMITTED SELECTION
Labs/EKG Rituxan Pregnancy And Lactation Text: This medication is Pregnancy Category C and it isn't know if it is safe during pregnancy. It is unknown if this medication is excreted in breast milk but similar antibodies are known to be excreted.

## 2022-06-07 ENCOUNTER — RX RENEWAL (OUTPATIENT)
Age: 57
End: 2022-06-07

## 2022-06-07 RX ORDER — FENOFIBRATE 145 MG/1
145 TABLET, COATED ORAL DAILY
Qty: 90 | Refills: 0 | Status: ACTIVE | COMMUNITY
Start: 2019-08-13 | End: 1900-01-01

## 2022-11-07 NOTE — PROGRESS NOTE BEHAVIORAL HEALTH - NSBHADMITMEDEDU_PSY_A_CORE
November 7, 2022    Sandee Hairston  [address] [place] [address]             Urgent Care - [place]  [facility] Care  [address] [address]  Phone: [phone]  Fax: [phone]   November 7, 2022     Patient: Sandee Hairston   YOB: 1982   Date of Visit: 11/7/2022       To Whom it May Concern:    Sandee Hairston was seen in my clinic on 11/7/2022. She may return to work on 11/9/22.    Please excuse her from any classes or work missed.    If you have any questions or concerns, please don't hesitate to call.    Sincerely,         Christiana Liriano NP     
yes...

## 2023-01-27 ENCOUNTER — APPOINTMENT (OUTPATIENT)
Dept: UROLOGY | Facility: CLINIC | Age: 58
End: 2023-01-27
Payer: MEDICARE

## 2023-01-27 VITALS
TEMPERATURE: 97.3 F | SYSTOLIC BLOOD PRESSURE: 109 MMHG | BODY MASS INDEX: 40.73 KG/M2 | WEIGHT: 275 LBS | HEART RATE: 91 BPM | HEIGHT: 69 IN | DIASTOLIC BLOOD PRESSURE: 64 MMHG

## 2023-01-27 PROCEDURE — 99213 OFFICE O/P EST LOW 20 MIN: CPT | Mod: 24

## 2023-01-29 NOTE — ASSESSMENT
[FreeTextEntry1] : Feels well\par No chills, fever, no pain\par Small opening in the post operative scar. \par Does not need any treatment

## 2023-01-29 NOTE — PHYSICAL EXAM
[General Appearance - Well Developed] : well developed [General Appearance - Well Nourished] : well nourished [Normal Appearance] : normal appearance [Well Groomed] : well groomed [General Appearance - In No Acute Distress] : no acute distress [Abdomen Soft] : soft [Abdomen Tenderness] : non-tender [Costovertebral Angle Tenderness] : no ~M costovertebral angle tenderness [Urethral Meatus] : meatus normal [Urinary Bladder Findings] : the bladder was normal on palpation [Scrotum] : the scrotum was normal [Testes Mass (___cm)] : there were no testicular masses [FreeTextEntry1] : small opening in the post operative scar  [Edema] : no peripheral edema [] : no respiratory distress [Respiration, Rhythm And Depth] : normal respiratory rhythm and effort [Exaggerated Use Of Accessory Muscles For Inspiration] : no accessory muscle use [Oriented To Time, Place, And Person] : oriented to person, place, and time [Affect] : the affect was normal [Mood] : the mood was normal [Not Anxious] : not anxious [Normal Station and Gait] : the gait and station were normal for the patient's age [No Focal Deficits] : no focal deficits [No Palpable Adenopathy] : no palpable adenopathy

## 2023-01-29 NOTE — HISTORY OF PRESENT ILLNESS
[FreeTextEntry1] : 57 year-old patient presented for the follow-up.[place] [facility] [place]\par Today feels well and only small postoperative opening \par No pain, chills and fever

## 2023-02-06 ENCOUNTER — APPOINTMENT (OUTPATIENT)
Dept: PULMONOLOGY | Facility: CLINIC | Age: 58
End: 2023-02-06

## 2023-02-06 ENCOUNTER — APPOINTMENT (OUTPATIENT)
Dept: UROLOGY | Facility: CLINIC | Age: 58
End: 2023-02-06
Payer: MEDICARE

## 2023-02-06 VITALS
HEIGHT: 69 IN | BODY MASS INDEX: 40.73 KG/M2 | TEMPERATURE: 97.3 F | SYSTOLIC BLOOD PRESSURE: 123 MMHG | DIASTOLIC BLOOD PRESSURE: 65 MMHG | HEART RATE: 67 BPM | WEIGHT: 275 LBS

## 2023-02-06 PROCEDURE — 99213 OFFICE O/P EST LOW 20 MIN: CPT

## 2023-02-06 NOTE — PHYSICAL EXAM
[General Appearance - Well Developed] : well developed [General Appearance - Well Nourished] : well nourished [Normal Appearance] : normal appearance [Well Groomed] : well groomed [General Appearance - In No Acute Distress] : no acute distress [Abdomen Soft] : soft [Abdomen Tenderness] : non-tender [Costovertebral Angle Tenderness] : no ~M costovertebral angle tenderness [Urethral Meatus] : meatus normal [Urinary Bladder Findings] : the bladder was normal on palpation [Scrotum] : the scrotum was normal [Testes Mass (___cm)] : there were no testicular masses [FreeTextEntry1] : small opening in the post operative  area with discharge coming from the low scrotal area  [Edema] : no peripheral edema [] : no respiratory distress [Respiration, Rhythm And Depth] : normal respiratory rhythm and effort [Exaggerated Use Of Accessory Muscles For Inspiration] : no accessory muscle use [Oriented To Time, Place, And Person] : oriented to person, place, and time [Affect] : the affect was normal [Mood] : the mood was normal [Not Anxious] : not anxious [Normal Station and Gait] : the gait and station were normal for the patient's age [No Focal Deficits] : no focal deficits [No Palpable Adenopathy] : no palpable adenopathy

## 2023-02-06 NOTE — HISTORY OF PRESENT ILLNESS
[FreeTextEntry1] : 57 year-old patient presented for the follow-up.\par [place] [facility] [place]\par Today feels and has no pain, chills and fever\par However, he reports pus discharged from the wound

## 2023-02-06 NOTE — ASSESSMENT
[FreeTextEntry1] : We took out all sutures - to avoid re - infection from the contamination \par We washed the wound\par  We left the packing with anti septic. \par We took the culture from wound

## 2023-02-07 ENCOUNTER — APPOINTMENT (OUTPATIENT)
Dept: UROLOGY | Facility: CLINIC | Age: 58
End: 2023-02-07
Payer: MEDICARE

## 2023-02-07 VITALS
HEART RATE: 67 BPM | DIASTOLIC BLOOD PRESSURE: 65 MMHG | WEIGHT: 275 LBS | TEMPERATURE: 96.6 F | BODY MASS INDEX: 40.73 KG/M2 | SYSTOLIC BLOOD PRESSURE: 116 MMHG | HEIGHT: 69 IN | OXYGEN SATURATION: 99 %

## 2023-02-07 PROCEDURE — 97597 DBRDMT OPN WND 1ST 20 CM/<: CPT

## 2023-02-08 ENCOUNTER — APPOINTMENT (OUTPATIENT)
Dept: UROLOGY | Facility: CLINIC | Age: 58
End: 2023-02-08
Payer: MEDICARE

## 2023-02-08 VITALS
HEIGHT: 69 IN | SYSTOLIC BLOOD PRESSURE: 119 MMHG | HEART RATE: 82 BPM | BODY MASS INDEX: 40.73 KG/M2 | WEIGHT: 275 LBS | TEMPERATURE: 97.3 F | DIASTOLIC BLOOD PRESSURE: 63 MMHG

## 2023-02-08 PROCEDURE — A6260: CPT | Mod: NC

## 2023-02-08 PROCEDURE — 97597 DBRDMT OPN WND 1ST 20 CM/<: CPT

## 2023-02-09 LAB — BACTERIA SPEC CULT: ABNORMAL

## 2023-02-10 ENCOUNTER — APPOINTMENT (OUTPATIENT)
Dept: PULMONOLOGY | Facility: CLINIC | Age: 58
End: 2023-02-10
Payer: MEDICARE

## 2023-02-10 ENCOUNTER — APPOINTMENT (OUTPATIENT)
Dept: UROLOGY | Facility: CLINIC | Age: 58
End: 2023-02-10

## 2023-02-10 VITALS
RESPIRATION RATE: 16 BRPM | OXYGEN SATURATION: 97 % | HEART RATE: 72 BPM | SYSTOLIC BLOOD PRESSURE: 98 MMHG | WEIGHT: 284 LBS | BODY MASS INDEX: 40.66 KG/M2 | DIASTOLIC BLOOD PRESSURE: 64 MMHG | HEIGHT: 70 IN

## 2023-02-10 DIAGNOSIS — G47.33 OBSTRUCTIVE SLEEP APNEA (ADULT) (PEDIATRIC): ICD-10-CM

## 2023-02-10 PROCEDURE — 99213 OFFICE O/P EST LOW 20 MIN: CPT

## 2023-02-10 RX ORDER — FUROSEMIDE 40 MG/1
40 TABLET ORAL DAILY
Qty: 90 | Refills: 0 | Status: DISCONTINUED | COMMUNITY
Start: 2021-06-07 | End: 2023-02-10

## 2023-02-10 NOTE — HISTORY OF PRESENT ILLNESS
[Former] : former [TextBox_4] : 57M PMH BIB not on CPAP, asthma, DVT (05/2021) s/p 3 months of Xarelto, abdominal hernia, HLD, depression, schizophrenia who presents for f/u. Pt is on Striverdi BID. 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 He felt Strivderdi helped significantly during this time. No fevers, no chills. No chest pain. No N/V/D. Quit heavy vaping use 01/2023 [TextBox_11] : 1 [TextBox_13] : 14 [YearQuit] : 1995 [TextBox_22] : Quit heavy vaping use 01/2023

## 2023-02-13 ENCOUNTER — APPOINTMENT (OUTPATIENT)
Dept: UROLOGY | Facility: CLINIC | Age: 58
End: 2023-02-13
Payer: MEDICARE

## 2023-02-13 VITALS
BODY MASS INDEX: 40.66 KG/M2 | TEMPERATURE: 97.3 F | DIASTOLIC BLOOD PRESSURE: 71 MMHG | SYSTOLIC BLOOD PRESSURE: 111 MMHG | HEIGHT: 70 IN | WEIGHT: 284 LBS | HEART RATE: 87 BPM

## 2023-02-13 PROCEDURE — 99213 OFFICE O/P EST LOW 20 MIN: CPT

## 2023-02-13 NOTE — ASSESSMENT
[FreeTextEntry1] : The postoperative wound showed the healthy granulations.\par We treated the wound with antiseptic.\par We started with the antibiotics based on the results of the culture.\par We are planning to make the secondary close of the wound in 3 days

## 2023-02-13 NOTE — HISTORY OF PRESENT ILLNESS
[FreeTextEntry1] : 57 year-old patient presented for the follow-up.\par [place] [facility] [place]\par Today feels and has no pain, chills and fever\par The discharge from the wound is much less and the content of discharge is serous

## 2023-02-14 ENCOUNTER — APPOINTMENT (OUTPATIENT)
Dept: UROLOGY | Facility: CLINIC | Age: 58
End: 2023-02-14
Payer: MEDICARE

## 2023-02-14 VITALS
BODY MASS INDEX: 36.22 KG/M2 | HEART RATE: 78 BPM | OXYGEN SATURATION: 99 % | TEMPERATURE: 96.6 F | HEIGHT: 70 IN | DIASTOLIC BLOOD PRESSURE: 63 MMHG | WEIGHT: 253 LBS | SYSTOLIC BLOOD PRESSURE: 112 MMHG

## 2023-02-14 PROCEDURE — 97597 DBRDMT OPN WND 1ST 20 CM/<: CPT

## 2023-02-14 PROCEDURE — A6260: CPT | Mod: NC

## 2023-02-15 ENCOUNTER — APPOINTMENT (OUTPATIENT)
Dept: UROLOGY | Facility: CLINIC | Age: 58
End: 2023-02-15
Payer: MEDICARE

## 2023-02-15 VITALS
BODY MASS INDEX: 36.22 KG/M2 | HEIGHT: 70 IN | HEART RATE: 72 BPM | TEMPERATURE: 97.8 F | DIASTOLIC BLOOD PRESSURE: 75 MMHG | WEIGHT: 253 LBS | SYSTOLIC BLOOD PRESSURE: 134 MMHG

## 2023-02-15 PROCEDURE — 13160 SEC CLSR SURG WND/DEHSN XTN: CPT

## 2023-02-16 ENCOUNTER — APPOINTMENT (OUTPATIENT)
Dept: UROLOGY | Facility: CLINIC | Age: 58
End: 2023-02-16
Payer: MEDICARE

## 2023-02-16 VITALS
SYSTOLIC BLOOD PRESSURE: 121 MMHG | BODY MASS INDEX: 36.22 KG/M2 | WEIGHT: 253 LBS | HEIGHT: 70 IN | HEART RATE: 80 BPM | TEMPERATURE: 97.3 F | DIASTOLIC BLOOD PRESSURE: 51 MMHG

## 2023-02-16 PROCEDURE — 99024 POSTOP FOLLOW-UP VISIT: CPT

## 2023-02-16 NOTE — ASSESSMENT
[FreeTextEntry1] : The postoperative wound was closed yesterday.\par No sign of infection and dehiscence.  Continue with the Cipro for additional 5 days.\par

## 2023-02-16 NOTE — PHYSICAL EXAM
[General Appearance - Well Developed] : well developed [General Appearance - Well Nourished] : well nourished [Normal Appearance] : normal appearance [Well Groomed] : well groomed [General Appearance - In No Acute Distress] : no acute distress [Abdomen Soft] : soft [Abdomen Tenderness] : non-tender [Costovertebral Angle Tenderness] : no ~M costovertebral angle tenderness [Urethral Meatus] : meatus normal [Urinary Bladder Findings] : the bladder was normal on palpation [Scrotum] : the scrotum was normal [Testes Mass (___cm)] : there were no testicular masses [FreeTextEntry1] : This is showed no stress of the secondary closure sign of infection or dehiscence [Edema] : no peripheral edema [] : no respiratory distress [Respiration, Rhythm And Depth] : normal respiratory rhythm and effort [Exaggerated Use Of Accessory Muscles For Inspiration] : no accessory muscle use [Oriented To Time, Place, And Person] : oriented to person, place, and time [Affect] : the affect was normal [Mood] : the mood was normal [Not Anxious] : not anxious [Normal Station and Gait] : the gait and station were normal for the patient's age [No Focal Deficits] : no focal deficits [No Palpable Adenopathy] : no palpable adenopathy

## 2023-02-16 NOTE — HISTORY OF PRESENT ILLNESS
[FreeTextEntry1] : 57-year-old patient presented today for the follow-up after the secondary closure of the postoperative wound.\par We did it yesterday.  Before the closure we checked that there is no pathological discharge from the wound and wound has a perfect healthy granulation\par We did it because the wound is located in the low scrotum and the chance of spontaneous closure is a little bit lower\par Patient feels fine.\par No bleeding.\par No chills no fever.

## 2023-02-27 ENCOUNTER — APPOINTMENT (OUTPATIENT)
Dept: UROLOGY | Facility: CLINIC | Age: 58
End: 2023-02-27
Payer: MEDICARE

## 2023-02-27 VITALS
WEIGHT: 285 LBS | BODY MASS INDEX: 40.8 KG/M2 | DIASTOLIC BLOOD PRESSURE: 71 MMHG | HEART RATE: 84 BPM | HEIGHT: 70 IN | SYSTOLIC BLOOD PRESSURE: 138 MMHG | TEMPERATURE: 96.5 F

## 2023-02-27 PROCEDURE — 99213 OFFICE O/P EST LOW 20 MIN: CPT | Mod: 24

## 2023-02-27 RX ORDER — BACITRACIN 500 [IU]/G
500 OINTMENT TOPICAL 3 TIMES DAILY
Qty: 30 | Refills: 0 | Status: ACTIVE | COMMUNITY
Start: 2023-02-27 | End: 1900-01-01

## 2023-02-27 NOTE — ASSESSMENT
[FreeTextEntry1] : Patient feels good.\par Good recovery of the postoperative wound.\par Continue with the antibiotic cream\par See patient in 3 weeks.

## 2023-02-27 NOTE — PHYSICAL EXAM
[General Appearance - Well Developed] : well developed [General Appearance - Well Nourished] : well nourished [Normal Appearance] : normal appearance [Well Groomed] : well groomed [General Appearance - In No Acute Distress] : no acute distress [Abdomen Soft] : soft [Abdomen Tenderness] : non-tender [Costovertebral Angle Tenderness] : no ~M costovertebral angle tenderness [Urethral Meatus] : meatus normal [Urinary Bladder Findings] : the bladder was normal on palpation [Scrotum] : the scrotum was normal [Testes Mass (___cm)] : there were no testicular masses [FreeTextEntry1] : This is no sign of infection or dehiscence [Edema] : no peripheral edema [] : no respiratory distress [Respiration, Rhythm And Depth] : normal respiratory rhythm and effort [Exaggerated Use Of Accessory Muscles For Inspiration] : no accessory muscle use [Oriented To Time, Place, And Person] : oriented to person, place, and time [Affect] : the affect was normal [Mood] : the mood was normal [Not Anxious] : not anxious [Normal Station and Gait] : the gait and station were normal for the patient's age [No Focal Deficits] : no focal deficits [No Palpable Adenopathy] : no palpable adenopathy

## 2023-02-27 NOTE — HISTORY OF PRESENT ILLNESS
[FreeTextEntry1] : 57-year-old patient presented today for the follow-up after the secondary closure of the postoperative wound.\par Patient feels much better.  There is no pathological discharge from the wound.\par

## 2023-03-06 ENCOUNTER — APPOINTMENT (OUTPATIENT)
Dept: PULMONOLOGY | Facility: CLINIC | Age: 58
End: 2023-03-06
Payer: MEDICARE

## 2023-03-06 VITALS — HEART RATE: 86 BPM | DIASTOLIC BLOOD PRESSURE: 70 MMHG | SYSTOLIC BLOOD PRESSURE: 130 MMHG | OXYGEN SATURATION: 97 %

## 2023-03-06 VITALS — HEIGHT: 70 IN | BODY MASS INDEX: 40.8 KG/M2 | RESPIRATION RATE: 16 BRPM | WEIGHT: 285 LBS

## 2023-03-06 DIAGNOSIS — F29 UNSPECIFIED PSYCHOSIS NOT DUE TO A SUBSTANCE OR KNOWN PHYSIOLOGICAL CONDITION: ICD-10-CM

## 2023-03-06 DIAGNOSIS — Z87.438 PERSONAL HISTORY OF OTHER DISEASES OF MALE GENITAL ORGANS: ICD-10-CM

## 2023-03-06 DIAGNOSIS — E66.01 MORBID (SEVERE) OBESITY DUE TO EXCESS CALORIES: ICD-10-CM

## 2023-03-06 DIAGNOSIS — Z87.891 PERSONAL HISTORY OF NICOTINE DEPENDENCE: ICD-10-CM

## 2023-03-06 DIAGNOSIS — F31.9 BIPOLAR DISORDER, UNSPECIFIED: ICD-10-CM

## 2023-03-06 DIAGNOSIS — E78.1 PURE HYPERGLYCERIDEMIA: ICD-10-CM

## 2023-03-06 DIAGNOSIS — G47.33 OBSTRUCTIVE SLEEP APNEA (ADULT) (PEDIATRIC): ICD-10-CM

## 2023-03-06 DIAGNOSIS — F20.9 SCHIZOPHRENIA, UNSPECIFIED: ICD-10-CM

## 2023-03-06 PROCEDURE — 99214 OFFICE O/P EST MOD 30 MIN: CPT

## 2023-03-06 NOTE — PHYSICAL EXAM
[General Appearance - In No Acute Distress] : no acute distress [Heart Rate And Rhythm] : heart rate was normal and rhythm regular [] : no respiratory distress [Respiration, Rhythm And Depth] : normal respiratory rhythm and effort [Exaggerated Use Of Accessory Muscles For Inspiration] : no accessory muscle use [Auscultation Breath Sounds / Voice Sounds] : lungs were clear to auscultation bilaterally [Skin Color & Pigmentation] : normal skin color and pigmentation [Oriented To Time, Place, And Person] : oriented to person, place, and time [Impaired Insight] : insight and judgment were intact [Affect] : the affect was normal [FreeTextEntry1] : c/w uvuloplasty

## 2023-03-06 NOTE — REVIEW OF SYSTEMS
[Obesity] : obesity [Negative] : Neurologic [FreeTextEntry3] : per HPI [FreeTextEntry8] : per HPI [de-identified] : scot schizophrenia

## 2023-03-06 NOTE — HISTORY OF PRESENT ILLNESS
[FreeTextEntry1] : First dx with BIB oralia 2002. Was on CPAP from then until about last year.\par \jack Has had studies at [facility], [facility] sleep center at [place], and [facility]; last one done at [facility] 1.5 years ago and told BIB resolved. Since then he has gained weight; he is having fragmented sleep. Sleeps alone in bed. Some EDS (ESS only 8). \par \par Also had UPPP he says around 2003. \par \par BMI 40.89.

## 2023-03-29 ENCOUNTER — APPOINTMENT (OUTPATIENT)
Dept: UROLOGY | Facility: CLINIC | Age: 58
End: 2023-03-29
Payer: MEDICARE

## 2023-03-29 VITALS
WEIGHT: 285 LBS | DIASTOLIC BLOOD PRESSURE: 67 MMHG | HEIGHT: 70 IN | BODY MASS INDEX: 40.8 KG/M2 | HEART RATE: 70 BPM | SYSTOLIC BLOOD PRESSURE: 112 MMHG | TEMPERATURE: 97.3 F

## 2023-03-29 PROCEDURE — 99214 OFFICE O/P EST MOD 30 MIN: CPT | Mod: 24

## 2023-03-29 NOTE — HISTORY OF PRESENT ILLNESS
[FreeTextEntry1] : 57-year-old patient presented today for the follow-up\par Last visit he complained on sensation of the gas discharge from the wound.\par This is why we ask him to do the pelvis CAT scan as soon as possible.\par Today he presented to discuss the results of the CAT scan.\par We also want to discuss the option for the wound closure.

## 2023-03-29 NOTE — ASSESSMENT
[FreeTextEntry1] : I personally reviewed pelvic CAT scan that showed no suspect for the fistula between the rectum and the scrotum.\par I explained this to the patient.\par Patient's wound now has no sign of infection and has a very good healthy granulations.\par Patient insisted on the secondary closure of the wound because he is already tied with the wound care.\par The wound looks very reasonable for the secondary closure we will do it in the nearest time

## 2023-03-29 NOTE — PHYSICAL EXAM
[General Appearance - Well Developed] : well developed [General Appearance - Well Nourished] : well nourished [Normal Appearance] : normal appearance [Well Groomed] : well groomed [General Appearance - In No Acute Distress] : no acute distress [Abdomen Soft] : soft [Abdomen Tenderness] : non-tender [Costovertebral Angle Tenderness] : no ~M costovertebral angle tenderness [Urethral Meatus] : meatus normal [Urinary Bladder Findings] : the bladder was normal on palpation [Scrotum] : the scrotum was normal [Testes Mass (___cm)] : there were no testicular masses [FreeTextEntry1] : This is no sign of infection. Healthy granulations. Wound defect of about 2 x 3 cm  [Edema] : no peripheral edema [] : no respiratory distress [Respiration, Rhythm And Depth] : normal respiratory rhythm and effort [Exaggerated Use Of Accessory Muscles For Inspiration] : no accessory muscle use [Oriented To Time, Place, And Person] : oriented to person, place, and time [Affect] : the affect was normal [Mood] : the mood was normal [Not Anxious] : not anxious [Normal Station and Gait] : the gait and station were normal for the patient's age [No Focal Deficits] : no focal deficits [No Palpable Adenopathy] : no palpable adenopathy

## 2023-04-13 ENCOUNTER — APPOINTMENT (OUTPATIENT)
Dept: UROLOGY | Facility: HOSPITAL | Age: 58
End: 2023-04-13

## 2023-04-13 RX ORDER — ACETAMINOPHEN 500 MG/1
500 TABLET, COATED ORAL
Qty: 30 | Refills: 0 | Status: ACTIVE | COMMUNITY
Start: 2023-04-13 | End: 1900-01-01

## 2023-04-17 ENCOUNTER — APPOINTMENT (OUTPATIENT)
Dept: UROLOGY | Facility: CLINIC | Age: 58
End: 2023-04-17
Payer: MEDICARE

## 2023-04-17 VITALS
BODY MASS INDEX: 40.8 KG/M2 | TEMPERATURE: 97 F | SYSTOLIC BLOOD PRESSURE: 115 MMHG | HEIGHT: 70 IN | OXYGEN SATURATION: 98 % | DIASTOLIC BLOOD PRESSURE: 68 MMHG | HEART RATE: 69 BPM | WEIGHT: 285 LBS | RESPIRATION RATE: 17 BRPM

## 2023-04-17 PROCEDURE — 99024 POSTOP FOLLOW-UP VISIT: CPT

## 2023-04-18 NOTE — PHYSICAL EXAM
[General Appearance - Well Developed] : well developed [General Appearance - Well Nourished] : well nourished [Normal Appearance] : normal appearance [Well Groomed] : well groomed [General Appearance - In No Acute Distress] : no acute distress [Abdomen Soft] : soft [Abdomen Tenderness] : non-tender [Costovertebral Angle Tenderness] : no ~M costovertebral angle tenderness [Urethral Meatus] : meatus normal [Urinary Bladder Findings] : the bladder was normal on palpation [Scrotum] : the scrotum was normal [Testes Mass (___cm)] : there were no testicular masses [FreeTextEntry1] : This is no sign of infection. Healthy healing   [Edema] : no peripheral edema [] : no respiratory distress [Respiration, Rhythm And Depth] : normal respiratory rhythm and effort [Exaggerated Use Of Accessory Muscles For Inspiration] : no accessory muscle use [Oriented To Time, Place, And Person] : oriented to person, place, and time [Affect] : the affect was normal [Mood] : the mood was normal [Not Anxious] : not anxious [Normal Station and Gait] : the gait and station were normal for the patient's age [No Focal Deficits] : no focal deficits [No Palpable Adenopathy] : no palpable adenopathy

## 2023-04-18 NOTE — HISTORY OF PRESENT ILLNESS
[FreeTextEntry1] : 58 year-old patient presented today for the follow-up\par Three days ago we did the secondary closure of scrotal wound after excision of scrotal abscess\par Patient feels well and has no complaint\par No chills and fever \par

## 2023-04-18 NOTE — ASSESSMENT
[FreeTextEntry1] : Patient feels good.\par Good recovery of the postoperative wound.\par Continue with the antibiotic\par See patient in 3 weeks.

## 2023-04-27 ENCOUNTER — APPOINTMENT (OUTPATIENT)
Dept: UROLOGY | Facility: CLINIC | Age: 58
End: 2023-04-27
Payer: MEDICARE

## 2023-04-27 VITALS
SYSTOLIC BLOOD PRESSURE: 130 MMHG | HEART RATE: 73 BPM | WEIGHT: 285 LBS | BODY MASS INDEX: 40.8 KG/M2 | HEIGHT: 70 IN | DIASTOLIC BLOOD PRESSURE: 77 MMHG | TEMPERATURE: 97.3 F

## 2023-04-27 PROCEDURE — 99213 OFFICE O/P EST LOW 20 MIN: CPT | Mod: 24

## 2023-04-27 NOTE — ASSESSMENT
[FreeTextEntry1] : Patient feels good.\par Good recovery of the postoperative wound.\par \par See patient in 3 weeks.

## 2023-04-27 NOTE — PHYSICAL EXAM
[General Appearance - Well Developed] : well developed [General Appearance - Well Nourished] : well nourished [Normal Appearance] : normal appearance [Well Groomed] : well groomed [General Appearance - In No Acute Distress] : no acute distress [Abdomen Soft] : soft [Abdomen Tenderness] : non-tender [Costovertebral Angle Tenderness] : no ~M costovertebral angle tenderness [Urethral Meatus] : meatus normal [Scrotum] : the scrotum was normal [Urinary Bladder Findings] : the bladder was normal on palpation [Testes Mass (___cm)] : there were no testicular masses [FreeTextEntry1] : This is no sign of infection. Healthy healing   [Edema] : no peripheral edema [Respiration, Rhythm And Depth] : normal respiratory rhythm and effort [] : no respiratory distress [Exaggerated Use Of Accessory Muscles For Inspiration] : no accessory muscle use [Oriented To Time, Place, And Person] : oriented to person, place, and time [Affect] : the affect was normal [Mood] : the mood was normal [Not Anxious] : not anxious [Normal Station and Gait] : the gait and station were normal for the patient's age [No Focal Deficits] : no focal deficits [No Palpable Adenopathy] : no palpable adenopathy

## 2023-04-27 NOTE — HISTORY OF PRESENT ILLNESS
[FreeTextEntry1] : 58 year-old patient presented today for the follow-up\par 10 days ago we did a secondary closure of the wound after the scrotal abscess.\par Patient's feels good had no chills or fever

## 2023-05-25 ENCOUNTER — APPOINTMENT (OUTPATIENT)
Dept: UROLOGY | Facility: CLINIC | Age: 58
End: 2023-05-25

## 2023-05-30 ENCOUNTER — APPOINTMENT (OUTPATIENT)
Dept: UROLOGY | Facility: CLINIC | Age: 58
End: 2023-05-30
Payer: MEDICARE

## 2023-05-30 VITALS
HEART RATE: 80 BPM | OXYGEN SATURATION: 97 % | BODY MASS INDEX: 42.95 KG/M2 | WEIGHT: 300 LBS | SYSTOLIC BLOOD PRESSURE: 119 MMHG | TEMPERATURE: 96.8 F | HEIGHT: 70 IN | DIASTOLIC BLOOD PRESSURE: 69 MMHG

## 2023-05-30 DIAGNOSIS — N49.2 INFLAMMATORY DISORDERS OF SCROTUM: ICD-10-CM

## 2023-05-30 PROCEDURE — 99213 OFFICE O/P EST LOW 20 MIN: CPT

## 2023-05-30 RX ORDER — CIPROFLOXACIN HYDROCHLORIDE 500 MG/1
500 TABLET, FILM COATED ORAL TWICE DAILY
Qty: 10 | Refills: 0 | Status: DISCONTINUED | COMMUNITY
Start: 2023-02-16 | End: 2023-05-30

## 2023-05-30 RX ORDER — CIPROFLOXACIN HYDROCHLORIDE 500 MG/1
500 TABLET, FILM COATED ORAL TWICE DAILY
Qty: 20 | Refills: 0 | Status: DISCONTINUED | COMMUNITY
Start: 2023-02-09 | End: 2023-05-30

## 2023-05-30 NOTE — ASSESSMENT
[FreeTextEntry1] : Very good recovery - clean and healthy post operative wound. \par No need for any additional intervention.

## 2023-05-30 NOTE — HISTORY OF PRESENT ILLNESS
[FreeTextEntry1] : 58 year-old patient presented today for the follow-up\par 1 month ago we did a secondary closure of the wound after the scrotal abscess.\par Patient's feels good had no chills or fever\par He has a very small "spotty discharge" from the post operative wound.

## 2023-06-23 ENCOUNTER — APPOINTMENT (OUTPATIENT)
Dept: PULMONOLOGY | Facility: CLINIC | Age: 58
End: 2023-06-23
Payer: MEDICARE

## 2023-06-23 VITALS
OXYGEN SATURATION: 99 % | DIASTOLIC BLOOD PRESSURE: 70 MMHG | HEART RATE: 75 BPM | RESPIRATION RATE: 16 BRPM | SYSTOLIC BLOOD PRESSURE: 140 MMHG

## 2023-06-23 VITALS — HEIGHT: 70.5 IN | BODY MASS INDEX: 42.47 KG/M2 | WEIGHT: 300 LBS

## 2023-06-23 PROCEDURE — 99214 OFFICE O/P EST MOD 30 MIN: CPT | Mod: 25

## 2023-06-23 PROCEDURE — 94010 BREATHING CAPACITY TEST: CPT

## 2023-06-23 RX ORDER — DIVALPROEX SODIUM 500 MG/1
500 TABLET, DELAYED RELEASE ORAL
Refills: 0 | Status: ACTIVE | COMMUNITY

## 2023-06-23 RX ORDER — CARIPRAZINE 3 MG/1
3 CAPSULE, GELATIN COATED ORAL
Refills: 0 | Status: DISCONTINUED | COMMUNITY
End: 2023-06-23

## 2023-06-23 RX ORDER — CEFDINIR 300 MG/1
300 CAPSULE ORAL
Qty: 10 | Refills: 0 | Status: DISCONTINUED | COMMUNITY
Start: 2023-04-13 | End: 2023-06-23

## 2023-06-23 RX ORDER — OLODATEROL RESPIMAT INHALATION SPRAY 2.5 UG/1
2.5 SPRAY, METERED RESPIRATORY (INHALATION) TWICE DAILY
Qty: 3 | Refills: 0 | Status: DISCONTINUED | COMMUNITY
Start: 2020-12-23 | End: 2023-06-23

## 2023-06-23 RX ORDER — PALIPERIDONE PALMITATE 117 MG/.75ML
117 INJECTION INTRAMUSCULAR
Refills: 0 | Status: ACTIVE | COMMUNITY

## 2023-06-23 RX ORDER — ALBUTEROL SULFATE 90 UG/1
108 (90 BASE) INHALANT RESPIRATORY (INHALATION)
Qty: 1 | Refills: 5 | Status: ACTIVE | COMMUNITY
Start: 2023-06-23 | End: 1900-01-01

## 2023-06-23 RX ORDER — CLOTRIMAZOLE 100 MG
TABLET VAGINAL
Refills: 0 | Status: ACTIVE | COMMUNITY

## 2023-06-23 RX ORDER — METFORMIN HYDROCHLORIDE 625 MG/1
TABLET ORAL
Refills: 0 | Status: ACTIVE | COMMUNITY

## 2023-06-23 NOTE — HISTORY OF PRESENT ILLNESS
[Former] : former [TextBox_4] : 58M PMH BIB not on CPAP, asthma, DVT (05/2021) s/p 3 months of Xarelto, abdominal hernia, HLD, depression, schizophrenia who presents for f/u, pulmonary optimization prior to colonoscopy. Pt is on Striverdi. Spirometry today showed FEV1/FVC 62.5%, FEV1 52.7%, FVC 67.6%, MMEF 75/25 24.2%. Dr. Tarango is sleep doctor. He reports some SOB, wheezing. No fevers, no chills. No chest pains, no N/V/D. \par \par Is planned for colonoscopy with Dr. Courtney Ortiz [phone] [TextBox_11] : 1 [TextBox_13] : 14 [YearQuit] : 1995 [TextBox_22] : former, quit heavy vaping use 01/2023

## 2023-08-03 ENCOUNTER — APPOINTMENT (OUTPATIENT)
Dept: UROLOGY | Facility: CLINIC | Age: 58
End: 2023-08-03

## 2023-08-07 ENCOUNTER — APPOINTMENT (OUTPATIENT)
Dept: PULMONOLOGY | Facility: CLINIC | Age: 58
End: 2023-08-07

## 2023-09-12 ENCOUNTER — APPOINTMENT (OUTPATIENT)
Dept: PULMONOLOGY | Facility: CLINIC | Age: 58
End: 2023-09-12
Payer: MEDICARE

## 2023-09-12 VITALS
WEIGHT: 310 LBS | BODY MASS INDEX: 43.88 KG/M2 | HEIGHT: 70.5 IN | HEART RATE: 74 BPM | OXYGEN SATURATION: 98 % | SYSTOLIC BLOOD PRESSURE: 138 MMHG | DIASTOLIC BLOOD PRESSURE: 76 MMHG | RESPIRATION RATE: 16 BRPM

## 2023-09-12 PROCEDURE — 99214 OFFICE O/P EST MOD 30 MIN: CPT

## 2023-09-12 RX ORDER — LORAZEPAM 0.5 MG/1
0.5 TABLET ORAL
Refills: 0 | Status: ACTIVE | COMMUNITY

## 2023-11-01 ENCOUNTER — APPOINTMENT (OUTPATIENT)
Dept: UROLOGY | Facility: CLINIC | Age: 58
End: 2023-11-01
Payer: MEDICARE

## 2023-11-01 VITALS
DIASTOLIC BLOOD PRESSURE: 74 MMHG | BODY MASS INDEX: 44.59 KG/M2 | HEART RATE: 60 BPM | SYSTOLIC BLOOD PRESSURE: 145 MMHG | WEIGHT: 315 LBS | HEIGHT: 70.5 IN | TEMPERATURE: 97.3 F

## 2023-11-01 DIAGNOSIS — N49.2 INFLAMMATORY DISORDERS OF SCROTUM: ICD-10-CM

## 2023-11-01 PROCEDURE — 99214 OFFICE O/P EST MOD 30 MIN: CPT

## 2023-11-20 ENCOUNTER — RESULT REVIEW (OUTPATIENT)
Age: 58
End: 2023-11-20

## 2023-11-20 ENCOUNTER — APPOINTMENT (OUTPATIENT)
Dept: UROLOGY | Facility: HOSPITAL | Age: 58
End: 2023-11-20

## 2023-11-20 RX ORDER — IBUPROFEN 600 MG/1
600 TABLET, FILM COATED ORAL TWICE DAILY
Qty: 10 | Refills: 0 | Status: ACTIVE | COMMUNITY
Start: 2023-11-20 | End: 1900-01-01

## 2023-11-20 RX ORDER — FLUTICASONE FUROATE 200 UG/1
200 POWDER RESPIRATORY (INHALATION) DAILY
Qty: 3 | Refills: 3 | Status: ACTIVE | COMMUNITY
Start: 2023-06-23 | End: 1900-01-01

## 2023-11-20 RX ORDER — CLINDAMYCIN HYDROCHLORIDE 300 MG/1
300 CAPSULE ORAL
Qty: 21 | Refills: 0 | Status: ACTIVE | COMMUNITY
Start: 2023-11-20 | End: 1900-01-01

## 2023-11-20 RX ORDER — ACETAMINOPHEN 500 MG/1
500 TABLET, COATED ORAL
Qty: 30 | Refills: 0 | Status: ACTIVE | COMMUNITY
Start: 2023-11-20 | End: 1900-01-01

## 2023-11-22 ENCOUNTER — APPOINTMENT (OUTPATIENT)
Dept: UROLOGY | Facility: CLINIC | Age: 58
End: 2023-11-22
Payer: MEDICARE

## 2023-11-22 VITALS
WEIGHT: 315 LBS | BODY MASS INDEX: 44.59 KG/M2 | SYSTOLIC BLOOD PRESSURE: 128 MMHG | HEART RATE: 60 BPM | HEIGHT: 70.5 IN | OXYGEN SATURATION: 98 % | DIASTOLIC BLOOD PRESSURE: 75 MMHG | TEMPERATURE: 97.2 F

## 2023-11-22 PROCEDURE — 99024 POSTOP FOLLOW-UP VISIT: CPT

## 2023-11-29 ENCOUNTER — APPOINTMENT (OUTPATIENT)
Dept: UROLOGY | Facility: CLINIC | Age: 58
End: 2023-11-29
Payer: MEDICARE

## 2023-11-29 VITALS
HEART RATE: 60 BPM | SYSTOLIC BLOOD PRESSURE: 120 MMHG | DIASTOLIC BLOOD PRESSURE: 68 MMHG | HEIGHT: 70.5 IN | BODY MASS INDEX: 44.59 KG/M2 | WEIGHT: 315 LBS | TEMPERATURE: 97.4 F | OXYGEN SATURATION: 96 %

## 2023-11-29 PROCEDURE — 99024 POSTOP FOLLOW-UP VISIT: CPT

## 2023-12-08 ENCOUNTER — APPOINTMENT (OUTPATIENT)
Dept: UROLOGY | Facility: CLINIC | Age: 58
End: 2023-12-08
Payer: MEDICARE

## 2023-12-08 VITALS
WEIGHT: 310 LBS | TEMPERATURE: 96.4 F | DIASTOLIC BLOOD PRESSURE: 71 MMHG | BODY MASS INDEX: 44.38 KG/M2 | HEIGHT: 70 IN | SYSTOLIC BLOOD PRESSURE: 131 MMHG | HEART RATE: 58 BPM

## 2023-12-08 DIAGNOSIS — N49.2 INFLAMMATORY DISORDERS OF SCROTUM: ICD-10-CM

## 2023-12-08 PROCEDURE — 99213 OFFICE O/P EST LOW 20 MIN: CPT

## 2023-12-20 ENCOUNTER — APPOINTMENT (OUTPATIENT)
Dept: UROLOGY | Facility: CLINIC | Age: 58
End: 2023-12-20
Payer: MEDICARE

## 2023-12-20 VITALS
WEIGHT: 305 LBS | HEIGHT: 70 IN | BODY MASS INDEX: 43.67 KG/M2 | HEART RATE: 82 BPM | DIASTOLIC BLOOD PRESSURE: 68 MMHG | SYSTOLIC BLOOD PRESSURE: 146 MMHG | TEMPERATURE: 97.3 F

## 2023-12-20 DIAGNOSIS — T81.30XA DISRUPTION OF WOUND, UNSPECIFIED, INITIAL ENCOUNTER: ICD-10-CM

## 2023-12-20 PROCEDURE — 99213 OFFICE O/P EST LOW 20 MIN: CPT

## 2023-12-20 NOTE — PHYSICAL EXAM
[Normal Appearance] : normal appearance [Well Groomed] : well groomed [General Appearance - In No Acute Distress] : no acute distress [Edema] : no peripheral edema [Respiration, Rhythm And Depth] : normal respiratory rhythm and effort [Exaggerated Use Of Accessory Muscles For Inspiration] : no accessory muscle use [Abdomen Tenderness] : non-tender [Abdomen Soft] : soft [Costovertebral Angle Tenderness] : no ~M costovertebral angle tenderness [Urinary Bladder Findings] : the bladder was normal on palpation [Normal Station and Gait] : the gait and station were normal for the patient's age [] : no rash [No Focal Deficits] : no focal deficits [Oriented To Time, Place, And Person] : oriented to person, place, and time [Affect] : the affect was normal [Mood] : the mood was normal [No Palpable Adenopathy] : no palpable adenopathy [de-identified] : Significant decrease of pathological discharge.  The wounds tissue looked very healthy

## 2023-12-20 NOTE — ASSESSMENT
[FreeTextEntry1] : Patient wounds showed very good healing.  Very small area of the open wound that is very shallow We treated wound with the silver No need for additional treatment.

## 2023-12-20 NOTE — HISTORY OF PRESENT ILLNESS
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  The tract was long about 7 cm.  Patient reported no chills, no fever very mild bloody discharge from the postoperative area Today he presented to check his wound

## 2023-12-22 ENCOUNTER — APPOINTMENT (OUTPATIENT)
Dept: OPHTHALMOLOGY | Facility: CLINIC | Age: 58
End: 2023-12-22
Payer: MEDICARE

## 2023-12-22 ENCOUNTER — NON-APPOINTMENT (OUTPATIENT)
Age: 58
End: 2023-12-22

## 2023-12-22 PROCEDURE — 92014 COMPRE OPH EXAM EST PT 1/>: CPT

## 2024-02-01 ENCOUNTER — APPOINTMENT (OUTPATIENT)
Dept: UROLOGY | Facility: CLINIC | Age: 59
End: 2024-02-01

## 2024-02-15 ENCOUNTER — APPOINTMENT (OUTPATIENT)
Dept: UROLOGY | Facility: CLINIC | Age: 59
End: 2024-02-15
Payer: MEDICARE

## 2024-02-15 VITALS
BODY MASS INDEX: 43.67 KG/M2 | DIASTOLIC BLOOD PRESSURE: 64 MMHG | TEMPERATURE: 97.3 F | HEIGHT: 70 IN | SYSTOLIC BLOOD PRESSURE: 107 MMHG | HEART RATE: 80 BPM | WEIGHT: 305 LBS

## 2024-02-15 PROCEDURE — 99213 OFFICE O/P EST LOW 20 MIN: CPT

## 2024-02-15 NOTE — PHYSICAL EXAM
[Normal Appearance] : normal appearance [Well Groomed] : well groomed [General Appearance - In No Acute Distress] : no acute distress [Edema] : no peripheral edema [Respiration, Rhythm And Depth] : normal respiratory rhythm and effort [Exaggerated Use Of Accessory Muscles For Inspiration] : no accessory muscle use [Abdomen Soft] : soft [Abdomen Tenderness] : non-tender [Costovertebral Angle Tenderness] : no ~M costovertebral angle tenderness [Urinary Bladder Findings] : the bladder was normal on palpation [Normal Station and Gait] : the gait and station were normal for the patient's age [] : no rash [No Focal Deficits] : no focal deficits [Oriented To Time, Place, And Person] : oriented to person, place, and time [Affect] : the affect was normal [Mood] : the mood was normal [No Palpable Adenopathy] : no palpable adenopathy [de-identified] : Very mild edema at the area of meatus
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
## 2024-02-15 NOTE — ASSESSMENT
[FreeTextEntry1] : I recommended patient to perform cystoscopy in 10 days from today to exclude the possible urethral stricture. Will discuss the need for any kind of treatment/intervention based on the cystoscopy results

## 2024-02-23 ENCOUNTER — APPOINTMENT (OUTPATIENT)
Dept: UROLOGY | Facility: CLINIC | Age: 59
End: 2024-02-23
Payer: MEDICARE

## 2024-02-23 VITALS
DIASTOLIC BLOOD PRESSURE: 72 MMHG | TEMPERATURE: 98.4 F | HEART RATE: 72 BPM | HEIGHT: 69.5 IN | BODY MASS INDEX: 41.26 KG/M2 | WEIGHT: 285 LBS | SYSTOLIC BLOOD PRESSURE: 117 MMHG

## 2024-02-23 LAB
BILIRUB UR QL STRIP: NORMAL
CLARITY UR: CLEAR
COLLECTION METHOD: NORMAL
GLUCOSE UR-MCNC: NORMAL
HCG UR QL: 0.2 EU/DL
HGB UR QL STRIP.AUTO: NORMAL
KETONES UR-MCNC: NORMAL
LEUKOCYTE ESTERASE UR QL STRIP: NORMAL
NITRITE UR QL STRIP: NORMAL
PH UR STRIP: 6
PROT UR STRIP-MCNC: NORMAL
SP GR UR STRIP: 1.02

## 2024-02-23 PROCEDURE — 81003 URINALYSIS AUTO W/O SCOPE: CPT | Mod: QW

## 2024-02-23 PROCEDURE — 52000 CYSTOURETHROSCOPY: CPT

## 2024-03-04 ENCOUNTER — APPOINTMENT (OUTPATIENT)
Dept: PULMONOLOGY | Facility: CLINIC | Age: 59
End: 2024-03-04

## 2024-03-19 NOTE — ED BEHAVIORAL HEALTH ASSESSMENT NOTE - ADDITIONAL DETAILS ALL
Jonathan Pang Derrick is having substantial problems and securing CPAP approval.  He is requesting further documentation.  I indicated he should reach out to you to help him move the process forward.  He is highly distressed and physical symptoms are causing significant disruptions to his insomnia, not the least of which are breathing related issues.  He is working with his [facility] to establish psychotherapy to help him cope with multiple stressors.  Ramírez Ruvalcaba
see hpi

## 2024-05-16 ENCOUNTER — APPOINTMENT (OUTPATIENT)
Dept: UROLOGY | Facility: CLINIC | Age: 59
End: 2024-05-16
Payer: MEDICARE

## 2024-05-16 VITALS
HEART RATE: 70 BPM | OXYGEN SATURATION: 97 % | TEMPERATURE: 98.3 F | BODY MASS INDEX: 41.26 KG/M2 | WEIGHT: 285 LBS | HEIGHT: 69.5 IN | DIASTOLIC BLOOD PRESSURE: 65 MMHG | SYSTOLIC BLOOD PRESSURE: 120 MMHG

## 2024-05-16 DIAGNOSIS — S37.30XS: ICD-10-CM

## 2024-05-16 PROCEDURE — 99212 OFFICE O/P EST SF 10 MIN: CPT

## 2024-05-16 NOTE — HISTORY OF PRESENT ILLNESS
[FreeTextEntry1] : 59-year-old patient presented today for the follow-up.  He is doing much better today.  No pain no pathological discharge from the perineal area.

## 2024-05-16 NOTE — ASSESSMENT
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  No need for any additional intervention.

## 2024-08-02 ENCOUNTER — NON-APPOINTMENT (OUTPATIENT)
Age: 59
End: 2024-08-02

## 2024-08-02 ENCOUNTER — APPOINTMENT (OUTPATIENT)
Dept: UROLOGY | Facility: CLINIC | Age: 59
End: 2024-08-02
Payer: MEDICARE

## 2024-08-02 VITALS
BODY MASS INDEX: 38.37 KG/M2 | SYSTOLIC BLOOD PRESSURE: 103 MMHG | DIASTOLIC BLOOD PRESSURE: 65 MMHG | TEMPERATURE: 97.3 F | HEIGHT: 69.5 IN | HEART RATE: 76 BPM | WEIGHT: 265 LBS

## 2024-08-02 DIAGNOSIS — N13.8 BENIGN PROSTATIC HYPERPLASIA WITH LOWER URINARY TRACT SYMPMS: ICD-10-CM

## 2024-08-02 DIAGNOSIS — N40.1 BENIGN PROSTATIC HYPERPLASIA WITH LOWER URINARY TRACT SYMPMS: ICD-10-CM

## 2024-08-02 PROCEDURE — 99214 OFFICE O/P EST MOD 30 MIN: CPT

## 2024-08-02 RX ORDER — TAMSULOSIN HYDROCHLORIDE 0.4 MG/1
0.4 CAPSULE ORAL
Qty: 30 | Refills: 3 | Status: ACTIVE | COMMUNITY
Start: 2024-08-02 | End: 1900-01-01

## 2024-08-02 NOTE — HISTORY OF PRESENT ILLNESS
[FreeTextEntry1] : 59-year-old patient presented today for the follow-up. Pt having urinary urgency and leakage for about 3 weeks. Pt offers no other complaints

## 2024-08-06 LAB
APPEARANCE: CLEAR
BACTERIA UR CULT: NORMAL
BACTERIA: NEGATIVE /HPF
BILIRUBIN URINE: NEGATIVE
BLOOD URINE: NEGATIVE
CAST: 0 /LPF
COLOR: YELLOW
EPITHELIAL CELLS: 0 /HPF
GLUCOSE QUALITATIVE U: NEGATIVE MG/DL
KETONES URINE: NEGATIVE MG/DL
LEUKOCYTE ESTERASE URINE: NEGATIVE
MICROSCOPIC-UA: NORMAL
NITRITE URINE: NEGATIVE
PH URINE: 6.5
PROTEIN URINE: NEGATIVE MG/DL
PSA SERPL-MCNC: 4.36 NG/ML
RED BLOOD CELLS URINE: 1 /HPF
SPECIFIC GRAVITY URINE: 1.02
UROBILINOGEN URINE: 0.2 MG/DL
WHITE BLOOD CELLS URINE: 0 /HPF

## 2024-08-10 ENCOUNTER — NON-APPOINTMENT (OUTPATIENT)
Age: 59
End: 2024-08-10

## 2024-08-16 ENCOUNTER — APPOINTMENT (OUTPATIENT)
Dept: UROLOGY | Facility: CLINIC | Age: 59
End: 2024-08-16
Payer: MEDICARE

## 2024-08-16 VITALS
TEMPERATURE: 97.3 F | WEIGHT: 265 LBS | SYSTOLIC BLOOD PRESSURE: 116 MMHG | DIASTOLIC BLOOD PRESSURE: 72 MMHG | BODY MASS INDEX: 38.37 KG/M2 | HEIGHT: 69.5 IN | HEART RATE: 76 BPM

## 2024-08-16 DIAGNOSIS — R97.20 ELEVATED PROSTATE, SPECIFIC ANTIGEN [PSA]: ICD-10-CM

## 2024-08-16 DIAGNOSIS — N28.1 CYST OF KIDNEY, ACQUIRED: ICD-10-CM

## 2024-08-16 DIAGNOSIS — N20.0 CALCULUS OF KIDNEY: ICD-10-CM

## 2024-08-16 DIAGNOSIS — N13.8 BENIGN PROSTATIC HYPERPLASIA WITH LOWER URINARY TRACT SYMPMS: ICD-10-CM

## 2024-08-16 DIAGNOSIS — N40.1 BENIGN PROSTATIC HYPERPLASIA WITH LOWER URINARY TRACT SYMPMS: ICD-10-CM

## 2024-08-16 PROCEDURE — 99214 OFFICE O/P EST MOD 30 MIN: CPT

## 2024-08-16 NOTE — HISTORY OF PRESENT ILLNESS
[FreeTextEntry1] : 59-year-old patient presented today for the follow-up. Pt having urinary urgency and leakage for about 3 weeks. Pt offers no other complaints   8/3/24 PSA 4.36  8/2/24 US left renal cyst, Left 3mm stone, PVR 94 prostate 44mL

## 2024-09-06 ENCOUNTER — APPOINTMENT (OUTPATIENT)
Dept: UROLOGY | Facility: CLINIC | Age: 59
End: 2024-09-06

## 2024-10-21 ENCOUNTER — NON-APPOINTMENT (OUTPATIENT)
Age: 59
End: 2024-10-21

## 2024-10-24 ENCOUNTER — APPOINTMENT (OUTPATIENT)
Dept: UROLOGY | Facility: CLINIC | Age: 59
End: 2024-10-24

## 2024-10-30 ENCOUNTER — RX RENEWAL (OUTPATIENT)
Age: 59
End: 2024-10-30

## 2024-12-04 ENCOUNTER — NON-APPOINTMENT (OUTPATIENT)
Age: 59
End: 2024-12-04

## 2024-12-27 ENCOUNTER — APPOINTMENT (OUTPATIENT)
Dept: UROLOGY | Facility: CLINIC | Age: 59
End: 2024-12-27
Payer: MEDICARE

## 2024-12-27 VITALS
DIASTOLIC BLOOD PRESSURE: 91 MMHG | HEART RATE: 67 BPM | BODY MASS INDEX: 39.25 KG/M2 | HEIGHT: 69 IN | SYSTOLIC BLOOD PRESSURE: 153 MMHG | WEIGHT: 265 LBS | TEMPERATURE: 97.4 F

## 2024-12-27 DIAGNOSIS — R97.20 ELEVATED PROSTATE, SPECIFIC ANTIGEN [PSA]: ICD-10-CM

## 2024-12-27 DIAGNOSIS — N40.1 BENIGN PROSTATIC HYPERPLASIA WITH LOWER URINARY TRACT SYMPMS: ICD-10-CM

## 2024-12-27 DIAGNOSIS — N13.8 BENIGN PROSTATIC HYPERPLASIA WITH LOWER URINARY TRACT SYMPMS: ICD-10-CM

## 2024-12-27 PROCEDURE — 99214 OFFICE O/P EST MOD 30 MIN: CPT
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
